# Patient Record
Sex: MALE | Race: WHITE | Employment: OTHER | URBAN - METROPOLITAN AREA
[De-identification: names, ages, dates, MRNs, and addresses within clinical notes are randomized per-mention and may not be internally consistent; named-entity substitution may affect disease eponyms.]

---

## 2017-01-03 ENCOUNTER — TELEPHONE (OUTPATIENT)
Dept: INTERNAL MEDICINE CLINIC | Age: 72
End: 2017-01-03

## 2017-01-03 DIAGNOSIS — N20.0 NEPHROLITHIASIS: Primary | ICD-10-CM

## 2017-01-03 NOTE — TELEPHONE ENCOUNTER
Pt needs new referral dx n20.0  appt date for today    Dr. Arjun Ford [phone]  Tax id [id number]  Fax [phone]

## 2017-01-04 NOTE — TELEPHONE ENCOUNTER
Referral porcessed for Dr. Josefina Denis [id number] 1/1/2017-1/1/2018 99 visits    Copy faxed to doctor and mailed to pt

## 2017-01-09 ENCOUNTER — HOSPITAL ENCOUNTER (OUTPATIENT)
Dept: GENERAL RADIOLOGY | Age: 72
Discharge: HOME | End: 2017-01-09
Payer: MEDICARE

## 2017-01-09 DIAGNOSIS — N20.0 NEPHROLITHIASIS: ICD-10-CM

## 2017-01-09 PROCEDURE — 74000 XR ABD (KUB): CPT

## 2017-01-10 ENCOUNTER — ANESTHESIA EVENT (OUTPATIENT)
Dept: SURGERY | Age: 72
End: 2017-01-10
Payer: MEDICARE

## 2017-01-10 ENCOUNTER — OFFICE VISIT (OUTPATIENT)
Dept: UROLOGY | Age: 72
End: 2017-01-10

## 2017-01-10 VITALS
HEART RATE: 70 BPM | HEIGHT: 71 IN | OXYGEN SATURATION: 98 % | SYSTOLIC BLOOD PRESSURE: 122 MMHG | BODY MASS INDEX: 26.88 KG/M2 | DIASTOLIC BLOOD PRESSURE: 71 MMHG | WEIGHT: 192 LBS

## 2017-01-10 DIAGNOSIS — N20.0 KIDNEY STONE: Primary | ICD-10-CM

## 2017-01-10 DIAGNOSIS — Z96.0 RETAINED URETERAL STENT: ICD-10-CM

## 2017-01-10 LAB
BILIRUB UR QL STRIP: NEGATIVE
GLUCOSE UR-MCNC: NEGATIVE MG/DL
KETONES P FAST UR STRIP-MCNC: NEGATIVE MG/DL
PH UR STRIP: 7 [PH] (ref 4.6–8)
PROT UR QL STRIP: NORMAL MG/DL
SP GR UR STRIP: 1.02 (ref 1–1.03)
UA UROBILINOGEN AMB POC: NORMAL (ref 0.2–1)
URINALYSIS CLARITY POC: CLEAR
URINALYSIS COLOR POC: YELLOW
URINE BLOOD POC: NORMAL
URINE LEUKOCYTES POC: NORMAL
URINE NITRITES POC: NEGATIVE

## 2017-01-10 RX ORDER — CIPROFLOXACIN 500 MG/1
TABLET ORAL
Qty: 3 TAB | Refills: 0 | Status: SHIPPED | OUTPATIENT
Start: 2017-01-10 | End: 2017-04-28 | Stop reason: ALTCHOICE

## 2017-01-10 RX ORDER — CIPROFLOXACIN 2 MG/ML
400 INJECTION, SOLUTION INTRAVENOUS ONCE
Status: CANCELLED | OUTPATIENT
Start: 2017-01-10 | End: 2017-01-10

## 2017-01-10 RX ORDER — SODIUM CHLORIDE 9 MG/ML
100 INJECTION, SOLUTION INTRAVENOUS CONTINUOUS
Status: CANCELLED | OUTPATIENT
Start: 2017-01-10 | End: 2017-01-11

## 2017-01-10 RX ORDER — OXYCODONE AND ACETAMINOPHEN 5; 325 MG/1; MG/1
1 TABLET ORAL
Qty: 40 TAB | Refills: 0 | Status: SHIPPED | OUTPATIENT
Start: 2017-01-10 | End: 2017-04-28 | Stop reason: ALTCHOICE

## 2017-01-10 NOTE — PROGRESS NOTES
[address]    Chief Complaint   Patient presents with    Kidney Stone     stent in place       History and Physical    The patient is a 71-year-old male  who underwent left ureteroscopy with laser trip C and prolonged stent placement because of a large stone in the distal ureter of unknown duration. The patient has been doing well with predictable amounts of exertional hematuria and bladder irritability    Past Medical History   Diagnosis Date    Asbestos exposure     FHx: hypothyroidism     GERD (gastroesophageal reflux disease)     Hyperlipidemia     Hypertension     Idiopathic hypersomnia     Nephrolithiasis     Unspecified sleep apnea      no C-Pap     Patient Active Problem List   Diagnosis Code    Allergic rhinitis J30.9    Nephrolithiasis N20.0    Asbestos exposure Z77.090    Idiopathic hypersomnia G47.11    FHx: hypothyroidism Z83.49    Vertigo R42    Sleep apnea, obstructive G47.33    Obstructive sleep apnea G47.33    Essential hypertension with goal blood pressure less than 140/90 I10    Hyperlipidemia LDL goal <100 E78.5    Acute pain of right shoulder M25.511     Past Surgical History   Procedure Laterality Date    Hx colonoscopy  4/8/14     10 yrs    Hx orthopaedic Right 10/27/2016     rotator cuff repair    Hx tonsillectomy       Current Outpatient Prescriptions   Medication Sig Dispense Refill    oxyCODONE-acetaminophen (PERCOCET) 5-325 mg per tablet Take 1 Tab by mouth every four (4) hours as needed for Pain. Max Daily Amount: 6 Tabs. 40 Tab 0    ciprofloxacin HCl (CIPRO) 500 mg tablet As directed 3 Tab 0    tamsulosin (FLOMAX) 0.4 mg capsule One q d pc 30 Cap 2    lovastatin (MEVACOR) 40 mg tablet TAKE 1 TABLET EVERY NIGHT 90 Tab 3    lisinopril-hydrochlorothiazide (PRINZIDE, ZESTORETIC) 20-25 mg per tablet Take 1 Tab by mouth daily. 90 Tab 3    HYDROmorphone (DILAUDID) 2 mg tablet Take 1 Tab by mouth every three (3) hours as needed for Pain.  Max Daily Amount: 16 mg. 40 Tab 0    docusate sodium (COLACE) 100 mg capsule Take 1 Cap by mouth two (2) times a day for 30 days. 60 Cap 0    ketorolac (TORADOL) 10 mg tablet Take 1 Tab by mouth every six (6) hours as needed for Pain. 30 Tab 0    ibuprofen (MOTRIN) 800 mg tablet Take 800 mg by mouth every six (6) hours as needed for Pain. No Known Allergies  Social History     Social History    Marital status:      Spouse name: N/A    Number of children: N/A    Years of education: N/A     Occupational History    Not on file.      Social History Main Topics    Smoking status: Never Smoker    Smokeless tobacco: Never Used    Alcohol use No    Drug use: No    Sexual activity: Yes     Partners: Female     Other Topics Concern    Not on file     Social History Narrative      Family History   Problem Relation Age of Onset    Heart Attack Father     Heart Disease Father     Hypertension Mother     Other Brother      hypothyroid    Other Sister      hypothyroid    Seizures Son              Visit Vitals    /71 (BP 1 Location: Left arm, BP Patient Position: Sitting)    Pulse 70    Ht 5' 11\" (1.803 m)    Wt 192 lb (87.1 kg)    SpO2 98%    BMI 26.78 kg/m2     Physical        Gen: WDWN adult NAD  Head  : normocephalic,  Normal ROM; eyes without normal pupils, EOMs, no masses;  conjunctiva normal  Neck: normal movement,  no evident mass,  No evident adenopathy, trachea midline,  Lungs clear to auscultation with no rales or ronchi or rubs  Cardiac NSR with no murmur, rub, extra sounds  Abd :bowel sounds normal, no masses, tenderness, organomegaly  Flanks     -    Extremities- no edema, arthritis, deformity, swelling  Psych- oriented, no evident anxiety, no cognitive impairment evident    Urine is hematuric no evidence of infection    KUB shows the stent in good location with no residual fragments                  Impression/ PLAN  Retained stent following ureteroscopy with laser gypsy    Plan:  Cystoscopy with stent removal under anesthesia at patient request.  Prescriptions are arranged ahead of time. The preparation, technique, convalescence are discussed. The patient is aware of the risks that include, but are not limited to, infection, failure, injury, delayed obstruction, and the very real risk that the patient could develop a ureteral stricture because of the unknown duration of this very large stone burden. This visit exceeded 25 minutes and >50% was counselling  The patient understands the discussion and plan    PLEASE NOTE:      This document has been produced using voice recognition software.   Unrecognized errors in transcription may be present    Miguel Alvarez MD

## 2017-01-10 NOTE — PATIENT INSTRUCTIONS
[employer] Activation    Thank you for requesting access to [employer]. Please follow the instructions below to securely access and download your online medical record. [employer] allows you to send messages to your doctor, view your test results, renew your prescriptions, schedule appointments, and more. How Do I Sign Up? 1. In your internet browser, go to [email or web address]. 2. Click on the First Time User? Click Here link in the Sign In box. You will see the New Member Sign Up page. 3. Enter your [employer] Access Code exactly as it appears below. You will not need to use this code after youve completed the sign-up process. If you do not sign up before the expiration date, you must request a new code. [employer] Access Code: [email or web address]  Expires: 2017  2:45 PM (This is the date your [employer] access code will )    4. Enter the last four digits of your Social Security Number (xxxx) and Date of Birth (mm/dd/yyyy) as indicated and click Submit. You will be taken to the next sign-up page. 5. Create a [employer] ID. This will be your [employer] login ID and cannot be changed, so think of one that is secure and easy to remember. 6. Create a [employer] password. You can change your password at any time. 7. Enter your Password Reset Question and Answer. This can be used at a later time if you forget your password. 8. Enter your e-mail address. You will receive e-mail notification when new information is available in [address] 9. Click Sign Up. You can now view and download portions of your medical record. 10. Click the Download Summary menu link to download a portable copy of your medical information. Additional Information    If you have questions, please visit the Frequently Asked Questions section of the [employer] website at [email or web address] Remember, [employer] is NOT to be used for urgent needs. For medical emergencies, dial 911.

## 2017-01-10 NOTE — PROGRESS NOTES
Mr. William Coto has a reminder for a \"due or due soon\" health maintenance. I have asked that he contact his primary care provider for follow-up on this health maintenance.

## 2017-01-10 NOTE — MR AVS SNAPSHOT
Visit Information Date & Time Provider Department Dept. Phone Encounter #  
 1/10/2017  9:15 AM [facility] [address] [phone] Your Appointments 2/9/2017  9:15 AM  
ULTRASOUND with Silvia Cartagena MD  
[facility]) Appt Note: renal ultrasound [address] [address]  
[phone] [address] [id number]  
  
    
 4/21/2017  8:05 AM  
LAB with [facility] NURSE VISIT Internist of [facility] Appt Note: labs for pe rm; Assess for BMI; labs for pe rm Assess for BMI  
 [address] [facility], [facility] [address] [place]  
  
   
 [address], [address]  
  
    
 4/28/2017  1:30 PM  
PHYSICAL with Amanda Kinsye MD  
Internist of [address] [place] [address]) Appt Note: rpe rm; rpe  
 [address] [facility], [address] [facility] [address] [place]  
  
   
 [address], [address] Upcoming Health Maintenance Date Due  
 MEDICARE YEARLY EXAM 10/31/2011 GLAUCOMA SCREENING Q2Y 6/17/2016 COLONOSCOPY 4/8/2024 DTaP/Tdap/Td series (2 - Td) 4/25/2026 Allergies as of 1/10/2017  Review Complete On: 1/10/2017 By: Lana Siu LPN No Known Allergies Current Immunizations  Reviewed on 9/27/2016 Name Date H1N1 FLU VACCINE 1/6/2010 Influenza High Dose Vaccine PF 9/27/2016  4:46 PM  
 Influenza Vaccine PF 10/4/2013 Influenza Vaccine Split 11/26/2012 Influenza Vaccine Whole 9/23/2011 Pneumococcal Conjugate (PCV-13) 9/27/2016  5:07 PM  
 Pneumococcal Vaccine (Unspecified Type) 11/26/2012 Tdap 4/25/2016 12:56 PM  
 Zoster 8/26/2011 Not reviewed this visit You Were Diagnosed With   
  
 Codes Comments Kidney stone    -  Primary ICD-10-CM: N20.0 ICD-9-CM: 592.0 Vitals BP Pulse Height(growth percentile) Weight(growth percentile) SpO2 BMI  
 122/71 (BP 1 Location: Left arm, BP Patient Position: Sitting) 70 5' 11\" (1.803 m) 192 lb (87.1 kg) 98% 26.78 kg/m2 Smoking Status Never Smoker Vitals History BMI and BSA Data Body Mass Index Body Surface Area  
 26.78 kg/m 2 2.09 m 2 Preferred Pharmacy Pharmacy Name Phone [address], [address] [facility] 22 [address] [facility] [phone] Your Updated Medication List  
  
   
This list is accurate as of: 1/10/17  9:59 AM.  Always use your most recent med list.  
  
  
  
  
 ciprofloxacin HCl 500 mg tablet Commonly known as:  CIPRO As directed  
  
 docusate sodium 100 mg capsule Commonly known as:  Joby Frank Take 1 Cap by mouth two (2) times a day for 30 days. HYDROmorphone 2 mg tablet Commonly known as:  DILAUDID Take 1 Tab by mouth every three (3) hours as needed for Pain. Max Daily Amount: 16 mg.  
  
 ibuprofen 800 mg tablet Commonly known as:  MOTRIN Take 800 mg by mouth every six (6) hours as needed for Pain.  
  
 ketorolac 10 mg tablet Commonly known as:  TORADOL Take 1 Tab by mouth every six (6) hours as needed for Pain. lisinopril-hydroCHLOROthiazide 20-25 mg per tablet Commonly known as:  Velasquez Bucy Take 1 Tab by mouth daily. lovastatin 40 mg tablet Commonly known as:  MEVACOR  
TAKE 1 TABLET EVERY NIGHT  
  
 oxyCODONE-acetaminophen 5-325 mg per tablet Commonly known as:  PERCOCET Take 1 Tab by mouth every four (4) hours as needed for Pain. Max Daily Amount: 6 Tabs. tamsulosin 0.4 mg capsule Commonly known as:  FLOMAX One q d pc  
  
  
  
  
Prescriptions Printed Refills  
 oxyCODONE-acetaminophen (PERCOCET) 5-325 mg per tablet 0 Sig: Take 1 Tab by mouth every four (4) hours as needed for Pain. Max Daily Amount: 6 Tabs. Class: Print  Route: Oral  
 ciprofloxacin HCl (CIPRO) 500 mg tablet 0 Sig: As directed Class: Print We Performed the Following AMB POC URINALYSIS DIP STICK AUTO W/O MICRO [50907 CPT(R)] Patient Instructions MyChart Activation Thank you for requesting access to [employer]. Please follow the instructions below to securely access and download your online medical record. [employer] allows you to send messages to your doctor, view your test results, renew your prescriptions, schedule appointments, and more. How Do I Sign Up? 1. In your internet browser, go to [email or web address]. 2. Click on the First Time User? Click Here link in the Sign In box. You will see the New Member Sign Up page. 3. Enter your [employer] Access Code exactly as it appears below. You will not need to use this code after youve completed the sign-up process. If you do not sign up before the expiration date, you must request a new code. [employer] Access Code: [email or web address] Expires: 2017  2:45 PM (This is the date your [employer] access code will ) 4. Enter the last four digits of your Social Security Number (xxxx) and Date of Birth (mm/dd/yyyy) as indicated and click Submit. You will be taken to the next sign-up page. 5. Create a [employer] ID. This will be your [employer] login ID and cannot be changed, so think of one that is secure and easy to remember. 6. Create a [employer] password. You can change your password at any time. 7. Enter your Password Reset Question and Answer. This can be used at a later time if you forget your password. 8. Enter your e-mail address. You will receive e-mail notification when new information is available in [address] 9. Click Sign Up. You can now view and download portions of your medical record. 10. Click the Download Summary menu link to download a portable copy of your medical information. Additional Information If you have questions, please visit the Frequently Asked Questions section of the [employer] website at [email or web address] Remember, [employer] is NOT to be used for urgent needs. For medical emergencies, dial 911. Introducing [facility] SERVICES! Katelyn Ling introduces [employer] patient portal. Now you can access parts of your medical record, email your doctor's office, and request medication refills online. 1. In your internet browser, go to [email or web address] 2. Click on the First Time User? Click Here link in the Sign In box. You will see the New Member Sign Up page. 3. Enter your [employer] Access Code exactly as it appears below. You will not need to use this code after youve completed the sign-up process. If you do not sign up before the expiration date, you must request a new code. · [employer] Access Code: [email or web address] Expires: 2/5/2017  2:45 PM 
 
4. Enter the last four digits of your Social Security Number (xxxx) and Date of Birth (mm/dd/yyyy) as indicated and click Submit. You will be taken to the next sign-up page. 5. Create a [employer] ID. This will be your [employer] login ID and cannot be changed, so think of one that is secure and easy to remember. 6. Create a [employer] password. You can change your password at any time. 7. Enter your Password Reset Question and Answer. This can be used at a later time if you forget your password. 8. Enter your e-mail address. You will receive e-mail notification when new information is available in [address] 9. Click Sign Up. You can now view and download portions of your medical record. 10. Click the Download Summary menu link to download a portable copy of your medical information. If you have questions, please visit the Frequently Asked Questions section of the [employer] website. Remember, [employer] is NOT to be used for urgent needs. For medical emergencies, dial 911. Now available from your iPhone and Android! Please provide this summary of care documentation to your next provider. Your primary care clinician is listed as Dave Acevedo. If you have any questions after today's visit, please call [phone].

## 2017-01-11 ENCOUNTER — ANESTHESIA (OUTPATIENT)
Dept: SURGERY | Age: 72
End: 2017-01-11
Payer: MEDICARE

## 2017-01-11 ENCOUNTER — SURGERY (OUTPATIENT)
Age: 72
End: 2017-01-11

## 2017-01-11 ENCOUNTER — HOSPITAL ENCOUNTER (OUTPATIENT)
Age: 72
Setting detail: OUTPATIENT SURGERY
Discharge: HOME | End: 2017-01-11
Attending: UROLOGY | Admitting: UROLOGY
Payer: MEDICARE

## 2017-01-11 VITALS
BODY MASS INDEX: 26.6 KG/M2 | WEIGHT: 190 LBS | OXYGEN SATURATION: 100 % | RESPIRATION RATE: 20 BRPM | SYSTOLIC BLOOD PRESSURE: 130 MMHG | HEIGHT: 71 IN | TEMPERATURE: 97.4 F | HEART RATE: 55 BPM | DIASTOLIC BLOOD PRESSURE: 81 MMHG

## 2017-01-11 LAB
BUN BLD-MCNC: 20 MG/DL (ref 7–18)
CHLORIDE BLD-SCNC: 104 MMOL/L (ref 100–108)
GLUCOSE BLD STRIP.AUTO-MCNC: 93 MG/DL (ref 74–106)
HCT VFR BLD CALC: 40 % (ref 36–49)
HGB BLD-MCNC: 13.6 G/DL (ref 12–16)
POTASSIUM BLD-SCNC: 3.9 MMOL/L (ref 3.5–5.5)
SODIUM BLD-SCNC: 139 MMOL/L (ref 136–145)

## 2017-01-11 PROCEDURE — 77030032490 HC SLV COMPR SCD KNE COVD -B: Performed by: UROLOGY

## 2017-01-11 PROCEDURE — 76210000020 HC REC RM PH II FIRST 0.5 HR: Performed by: UROLOGY

## 2017-01-11 PROCEDURE — 77030018836 HC SOL IRR NACL ICUM -A: Performed by: UROLOGY

## 2017-01-11 PROCEDURE — 74011250636 HC RX REV CODE- 250/636

## 2017-01-11 PROCEDURE — 77030020782 HC GWN BAIR PAWS FLX 3M -B: Performed by: UROLOGY

## 2017-01-11 PROCEDURE — 76060000031 HC ANESTHESIA FIRST 0.5 HR: Performed by: UROLOGY

## 2017-01-11 PROCEDURE — 74011250636 HC RX REV CODE- 250/636: Performed by: UROLOGY

## 2017-01-11 PROCEDURE — 74011000250 HC RX REV CODE- 250

## 2017-01-11 PROCEDURE — 77030019927 HC TBNG IRR CYSTO BAXT -A: Performed by: UROLOGY

## 2017-01-11 PROCEDURE — 82947 ASSAY GLUCOSE BLOOD QUANT: CPT

## 2017-01-11 PROCEDURE — 76010000154 HC OR TIME FIRST 0.5 HR: Performed by: UROLOGY

## 2017-01-11 PROCEDURE — 74011250636 HC RX REV CODE- 250/636: Performed by: NURSE ANESTHETIST, CERTIFIED REGISTERED

## 2017-01-11 PROCEDURE — 76210000016 HC OR PH I REC 1 TO 1.5 HR: Performed by: UROLOGY

## 2017-01-11 PROCEDURE — 77030010509 HC AIRWY LMA MSK TELE -A: Performed by: ANESTHESIOLOGY

## 2017-01-11 PROCEDURE — 74011250637 HC RX REV CODE- 250/637: Performed by: NURSE ANESTHETIST, CERTIFIED REGISTERED

## 2017-01-11 RX ORDER — CIPROFLOXACIN 2 MG/ML
400 INJECTION, SOLUTION INTRAVENOUS ONCE
Status: COMPLETED | OUTPATIENT
Start: 2017-01-11 | End: 2017-01-11

## 2017-01-11 RX ORDER — SODIUM CHLORIDE 0.9 % (FLUSH) 0.9 %
5-10 SYRINGE (ML) INJECTION AS NEEDED
Status: DISCONTINUED | OUTPATIENT
Start: 2017-01-11 | End: 2017-01-11 | Stop reason: HOSPADM

## 2017-01-11 RX ORDER — SODIUM CHLORIDE, SODIUM LACTATE, POTASSIUM CHLORIDE, CALCIUM CHLORIDE 600; 310; 30; 20 MG/100ML; MG/100ML; MG/100ML; MG/100ML
75 INJECTION, SOLUTION INTRAVENOUS CONTINUOUS
Status: DISCONTINUED | OUTPATIENT
Start: 2017-01-11 | End: 2017-01-11 | Stop reason: HOSPADM

## 2017-01-11 RX ORDER — MAGNESIUM SULFATE 100 %
4 CRYSTALS MISCELLANEOUS AS NEEDED
Status: DISCONTINUED | OUTPATIENT
Start: 2017-01-11 | End: 2017-01-11 | Stop reason: HOSPADM

## 2017-01-11 RX ORDER — PROPOFOL 10 MG/ML
INJECTION, EMULSION INTRAVENOUS AS NEEDED
Status: DISCONTINUED | OUTPATIENT
Start: 2017-01-11 | End: 2017-01-11 | Stop reason: HOSPADM

## 2017-01-11 RX ORDER — HYDROMORPHONE HYDROCHLORIDE 2 MG/ML
0.5 INJECTION, SOLUTION INTRAMUSCULAR; INTRAVENOUS; SUBCUTANEOUS AS NEEDED
Status: DISCONTINUED | OUTPATIENT
Start: 2017-01-11 | End: 2017-01-11 | Stop reason: HOSPADM

## 2017-01-11 RX ORDER — SODIUM CHLORIDE 9 MG/ML
100 INJECTION, SOLUTION INTRAVENOUS CONTINUOUS
Status: DISCONTINUED | OUTPATIENT
Start: 2017-01-11 | End: 2017-01-11 | Stop reason: HOSPADM

## 2017-01-11 RX ORDER — ONDANSETRON 2 MG/ML
4 INJECTION INTRAMUSCULAR; INTRAVENOUS ONCE
Status: DISCONTINUED | OUTPATIENT
Start: 2017-01-11 | End: 2017-01-11 | Stop reason: HOSPADM

## 2017-01-11 RX ORDER — DEXTROSE 50 % IN WATER (D50W) INTRAVENOUS SYRINGE
25-50 AS NEEDED
Status: DISCONTINUED | OUTPATIENT
Start: 2017-01-11 | End: 2017-01-11 | Stop reason: HOSPADM

## 2017-01-11 RX ORDER — FENTANYL CITRATE 50 UG/ML
INJECTION, SOLUTION INTRAMUSCULAR; INTRAVENOUS AS NEEDED
Status: DISCONTINUED | OUTPATIENT
Start: 2017-01-11 | End: 2017-01-11 | Stop reason: HOSPADM

## 2017-01-11 RX ORDER — LIDOCAINE HYDROCHLORIDE 20 MG/ML
INJECTION, SOLUTION EPIDURAL; INFILTRATION; INTRACAUDAL; PERINEURAL AS NEEDED
Status: DISCONTINUED | OUTPATIENT
Start: 2017-01-11 | End: 2017-01-11 | Stop reason: HOSPADM

## 2017-01-11 RX ORDER — FAMOTIDINE 20 MG/1
20 TABLET, FILM COATED ORAL ONCE
Status: COMPLETED | OUTPATIENT
Start: 2017-01-11 | End: 2017-01-11

## 2017-01-11 RX ORDER — SODIUM CHLORIDE 0.9 % (FLUSH) 0.9 %
5-10 SYRINGE (ML) INJECTION EVERY 8 HOURS
Status: DISCONTINUED | OUTPATIENT
Start: 2017-01-11 | End: 2017-01-11 | Stop reason: HOSPADM

## 2017-01-11 RX ADMIN — LIDOCAINE HYDROCHLORIDE 60 MG: 20 INJECTION, SOLUTION EPIDURAL; INFILTRATION; INTRACAUDAL; PERINEURAL at 13:18

## 2017-01-11 RX ADMIN — CIPROFLOXACIN 400 MG: 2 INJECTION, SOLUTION INTRAVENOUS at 13:10

## 2017-01-11 RX ADMIN — PROPOFOL 150 MG: 10 INJECTION, EMULSION INTRAVENOUS at 13:18

## 2017-01-11 RX ADMIN — FENTANYL CITRATE 50 MCG: 50 INJECTION, SOLUTION INTRAMUSCULAR; INTRAVENOUS at 13:24

## 2017-01-11 RX ADMIN — FAMOTIDINE 20 MG: 20 TABLET ORAL at 10:45

## 2017-01-11 RX ADMIN — SODIUM CHLORIDE, SODIUM LACTATE, POTASSIUM CHLORIDE, AND CALCIUM CHLORIDE 75 ML/HR: 600; 310; 30; 20 INJECTION, SOLUTION INTRAVENOUS at 10:45

## 2017-01-11 RX ADMIN — FENTANYL CITRATE 50 MCG: 50 INJECTION, SOLUTION INTRAMUSCULAR; INTRAVENOUS at 13:18

## 2017-01-11 NOTE — ANESTHESIA PREPROCEDURE EVALUATION
Anesthetic History   No history of anesthetic complications            Review of Systems / Medical History  Patient summary reviewed and pertinent labs reviewed    Pulmonary        Sleep apnea (Does not use regularly): CPAP           Neuro/Psych   Within defined limits           Cardiovascular    Hypertension: well controlled              Exercise tolerance: >4 METS     GI/Hepatic/Renal             Pertinent negatives: GERD: food related. Endo/Other             Other Findings   Comments: Current Smoker? NO       Elective Surgery? Yes       Abstained from smoking 24 hours prior to anesthesia? N/A    Risk Factors for Postoperative nausea/vomiting:       History of postoperative nausea/vomiting? NO       Female? NO       Motion sickness? NO       Intended opioid administration for postoperative analgesia?   NO           Physical Exam    Airway  Mallampati: II  TM Distance: 4 - 6 cm  Neck ROM: normal range of motion   Mouth opening: Normal     Cardiovascular  Regular rate and rhythm,  S1 and S2 normal,  no murmur, click, rub, or gallop             Dental  No notable dental hx       Pulmonary  Breath sounds clear to auscultation               Abdominal  GI exam deferred       Other Findings            Anesthetic Plan    ASA: 2  Anesthesia type: general          Induction: Intravenous  Anesthetic plan and risks discussed with: Patient

## 2017-01-11 NOTE — IP AVS SNAPSHOT
Jose M Bragg 
 
 
 [address] Patient: Sneha Moses MRN: [id number] :1945 You are allergic to the following No active allergies Recent Documentation Height Weight BMI Smoking Status 1.803 m 86.2 kg 26.5 kg/m2 Never Smoker Emergency Contacts Name Discharge Info Relation Home Work Mobile Siobhan Bean DISCHARGE CAREGIVER [3] Spouse [3] [phone] About your hospitalization You were admitted on:  2017 You last received care in the:  [facility] PACU You were discharged on:  2017 Unit phone number:  [phone] Why you were hospitalized Your primary diagnosis was:  Not on File Providers Seen During Your Hospitalizations Provider Role Specialty Primary office phone Shaka Bustamante MD Attending Provider Urology [phone] Your Primary Care Physician (PCP) Primary Care Physician Office Phone Office Fax Early Lamont [phone] Follow-up Information Follow up With Details Comments Contact Info Britton Peterson MD   [address] [facility] [address] [facility] 
[phone] Shaka Bustamante MD  Follow up in office in one month for ultrasound [address] [facility] [address] [facility] [address] [address] 
[phone] Your Appointments   9:15 AM EST ULTRASOUND with Shaka Bustamante MD  
[facility] [address] [place] [address]) [address] [facility] [address] [address]  
[phone] Current Discharge Medication List  
  
CONTINUE these medications which have NOT CHANGED Dose & Instructions Dispensing Information Comments Morning Noon Evening Bedtime  
 ciprofloxacin HCl 500 mg tablet Commonly known as:  CIPRO Your next dose is: Today, Tomorrow Other:  _________ As directed Quantity:  3 Tab Refills:  0  
     
   
   
   
  
 docusate sodium 100 mg capsule Commonly known as:  Edwige Stallings Your next dose is: Today, Tomorrow Other:  _________ Dose:  100 mg Take 1 Cap by mouth two (2) times a day for 30 days. Quantity:  60 Cap Refills:  0 HYDROmorphone 2 mg tablet Commonly known as:  DILAUDID Your next dose is: Today, Tomorrow Other:  _________ Dose:  2 mg Take 1 Tab by mouth every three (3) hours as needed for Pain. Max Daily Amount: 16 mg.  
 Quantity:  40 Tab Refills:  0  
     
   
   
   
  
 ibuprofen 800 mg tablet Commonly known as:  MOTRIN Your next dose is: Today, Tomorrow Other:  _________ Dose:  800 mg Take 800 mg by mouth every six (6) hours as needed for Pain. Refills:  0  
     
   
   
   
  
 ketorolac 10 mg tablet Commonly known as:  TORADOL Your next dose is: Today, Tomorrow Other:  _________ Dose:  10 mg Take 1 Tab by mouth every six (6) hours as needed for Pain. Quantity:  30 Tab Refills:  0  
     
   
   
   
  
 lisinopril-hydroCHLOROthiazide 20-25 mg per tablet Commonly known as:  Kathalene Rockers Your next dose is: Today, Tomorrow Other:  _________ Dose:  1 Tab Take 1 Tab by mouth daily. Quantity:  90 Tab Refills:  3  
     
   
   
   
  
 lovastatin 40 mg tablet Commonly known as:  MEVACOR Your next dose is: Today, Tomorrow Other:  _________ TAKE 1 TABLET EVERY NIGHT Quantity:  90 Tab Refills:  3  
     
   
   
   
  
 oxyCODONE-acetaminophen 5-325 mg per tablet Commonly known as:  PERCOCET Your next dose is: Today, Tomorrow Other:  _________ Dose:  1 Tab Take 1 Tab by mouth every four (4) hours as needed for Pain.  Max Daily Amount: 6 Tabs. Quantity:  40 Tab Refills:  0  
     
   
   
   
  
 tamsulosin 0.4 mg capsule Commonly known as:  FLOMAX Your next dose is: Today, Tomorrow Other:  _________ One q d pc Quantity:  30 Cap Refills:  2 Discharge Instructions Cystoscopy: What to Expect at [facility] Your Recovery A cystoscopy is a procedure that lets a doctor look inside of the bladder and the urethra. The urethra is the tube that carries urine from the bladder to outside the body. The doctor uses a thin, lighted tube called a cystoscope to look for kidney or bladder stones, tumors, bleeding, or infection. After the cystoscopy, your urethra may be sore at first, and it may burn when you urinate for the first few days after the procedure. You may feel the need to urinate more often, and your urine may be pink. These symptoms should get better in 1 or 2 days. You will probably be able to go back to work or most of your usual activities in 1 or 2 days. This care sheet gives you a general idea about how long it will take for you to recover. But each person recovers at a different pace. Follow the steps below to get better as quickly as possible. How can you care for yourself at home? Activity · Rest when you feel tired. Getting enough sleep will help you recover. · Try to walk each day. Start by walking a little more than you did the day before. Bit by bit, increase the amount you walk. Walking boosts blood flow and helps prevent pneumonia and constipation. · Avoid strenuous activities, such as bicycle riding, jogging, weight lifting, or aerobic exercise, until your doctor says it is okay. · Ask your doctor when you can drive again. · Most people are able to return to work within 1 or 2 days after the procedure. · You may shower and take baths as usual. 
· Ask your doctor when it is okay for you to have sex. Diet · You can eat your normal diet. If your stomach is upset, try bland, low-fat foods like plain rice, broiled chicken, toast, and yogurt. · Drink plenty of fluids (unless your doctor tells you not to). Medicines · Take pain medicines exactly as directed. ¨ If the doctor gave you a prescription medicine for pain, take it as prescribed. ¨ If you are not taking a prescription pain medicine, ask your doctor if you can take an over-the-counter medicine. · If you think your pain medicine is making you sick to your stomach: 
¨ Take your medicine after meals (unless your doctor has told you not to). ¨ Ask your doctor for a different pain medicine. · If your doctor prescribed antibiotics, take them as directed. Do not stop taking them just because you feel better. You need to take the full course of antibiotics. Follow-up care is a key part of your treatment and safety. Be sure to make and go to all appointments, and call your doctor if you are having problems. It's also a good idea to know your test results and keep a list of the medicines you take. When should you call for help? Call 911 anytime you think you may need emergency care. For example, call if: 
· You passed out (lost consciousness). · You have severe trouble breathing. · You have sudden chest pain and shortness of breath, or you cough up blood. · You have severe belly pain. Call your doctor now or seek immediate medical care if: 
· You are sick to your stomach or cannot keep fluids down. · Your urine is still red or you see blood clots after you have urinated several times. · You have trouble passing urine or stool, especially if you have pain or swelling in your lower belly. · You have signs of a blood clot, such as: 
¨ Pain in your calf, back of the knee, thigh, or groin. ¨ Redness and swelling in your leg or groin. · You develop a fever or severe chills. · You have pain in your back just below your rib cage. This is called flank pain. Watch closely for changes in your health, and be sure to contact your doctor if: 
· You have pain or burning when you urinate. A burning feeling is normal for a day or two after the test, but call if it does not get better. · You have a frequent urge to urinate but can pass only small amounts of urine. · Your urine is pink, red, or cloudy, or smells bad. It is normal for the urine to have a pinkish color for a few days after the test, but call if it does not get better. Where can you learn more? Go to http://adam-andrei.info/. Enter I597 in the search box to learn more about \"Cystoscopy: What to Expect at Home. \" Current as of: August 12, 2016 Content Version: 11.1 © 2006-2016 [employer]. Care instructions adapted under license by [employer] (which disclaims liability or warranty for this information). If you have questions about a medical condition or this instruction, always ask your healthcare professional. [place] [address] any warranty or liability for your use of this information. DISCHARGE SUMMARY from Nurse The following personal items are in your possession at time of discharge: 
 
Dental Appliances: None Home Medications: None Jewelry: None Clothing: Jacket/Coat, Pants, Undergarments, Footwear, [address], [employer] Other Valuables: Chaitanya Rahman PATIENT INSTRUCTIONS: 
 
 
F-face looks uneven A-arms unable to move or move unevenly S-speech slurred or non-existent T-time-call 911 as soon as signs and symptoms begin-DO NOT go Back to bed or wait to see if you get better-TIME IS BRAIN. Warning Signs of HEART ATTACK Call 911 if you have these symptoms: 
? Chest discomfort.  Most heart attacks involve discomfort in the center of the chest that lasts more than a few minutes, or that goes away and comes back. It can feel like uncomfortable pressure, squeezing, fullness, or pain. ? Discomfort in other areas of the upper body. Symptoms can include pain or discomfort in one or both arms, the back, neck, jaw, or stomach. ? Shortness of breath with or without chest discomfort. ? Other signs may include breaking out in a cold sweat, nausea, or lightheadedness. Don't wait more than five minutes to call [address]! Fast action can save your life. Calling 911 is almost always the fastest way to get lifesaving treatment. Emergency Medical Services staff can begin treatment when they arrive  up to an hour sooner than if someone gets to the hospital by car. The discharge information has been reviewed with the patient and spouse. The patient and spouse verbalized understanding. Discharge medications reviewed with the patient and spouse and appropriate educational materials and side effects teaching were provided. Discharge Orders None Introducing [facility]! Marcella Barfield introduces Springpad patient portal. Now you can access parts of your medical record, email your doctor's office, and request medication refills online. 1. In your internet browser, go to [email or web address] 2. Click on the First Time User? Click Here link in the Sign In box. You will see the New Member Sign Up page. 3. Enter your Springpad Access Code exactly as it appears below. You will not need to use this code after youve completed the sign-up process. If you do not sign up before the expiration date, you must request a new code. · Springpad Access Code: [email or web address] Expires: 2/5/2017  2:45 PM 
 
4. Enter the last four digits of your Social Security Number (xxxx) and Date of Birth (mm/dd/yyyy) as indicated and click Submit. You will be taken to the next sign-up page. 5. Create a Springpad ID.  This will be your Springpad login ID and cannot be changed, so think of one that is secure and easy to remember. 6. Create a Playteau password. You can change your password at any time. 7. Enter your Password Reset Question and Answer. This can be used at a later time if you forget your password. 8. Enter your e-mail address. You will receive e-mail notification when new information is available in [address] 9. Click Sign Up. You can now view and download portions of your medical record. 10. Click the Download Summary menu link to download a portable copy of your medical information. If you have questions, please visit the Frequently Asked Questions section of the Playteau website. Remember, Playteau is NOT to be used for urgent needs. For medical emergencies, dial 911. Now available from your iPhone and Android! General Information Please provide this summary of care documentation to your next provider. Patient Signature:  ____________________________________________________________ Date:  ____________________________________________________________  
  
Heather Alvarado Provider Signature:  ____________________________________________________________ Date:  ____________________________________________________________

## 2017-01-11 NOTE — DISCHARGE INSTRUCTIONS
Cystoscopy: What to Expect at [address] [facility]    A cystoscopy is a procedure that lets a doctor look inside of the bladder and the urethra. The urethra is the tube that carries urine from the bladder to outside the body. The doctor uses a thin, lighted tube called a cystoscope to look for kidney or bladder stones, tumors, bleeding, or infection. After the cystoscopy, your urethra may be sore at first, and it may burn when you urinate for the first few days after the procedure. You may feel the need to urinate more often, and your urine may be pink. These symptoms should get better in 1 or 2 days. You will probably be able to go back to work or most of your usual activities in 1 or 2 days. This care sheet gives you a general idea about how long it will take for you to recover. But each person recovers at a different pace. Follow the steps below to get better as quickly as possible. How can you care for yourself at home? Activity  · Rest when you feel tired. Getting enough sleep will help you recover. · Try to walk each day. Start by walking a little more than you did the day before. Bit by bit, increase the amount you walk. Walking boosts blood flow and helps prevent pneumonia and constipation. · Avoid strenuous activities, such as bicycle riding, jogging, weight lifting, or aerobic exercise, until your doctor says it is okay. · Ask your doctor when you can drive again. · Most people are able to return to work within 1 or 2 days after the procedure. · You may shower and take baths as usual.  · Ask your doctor when it is okay for you to have sex. Diet  · You can eat your normal diet. If your stomach is upset, try bland, low-fat foods like plain rice, broiled chicken, toast, and yogurt. · Drink plenty of fluids (unless your doctor tells you not to). Medicines  · Take pain medicines exactly as directed. ¨ If the doctor gave you a prescription medicine for pain, take it as prescribed.   ¨ If you are not taking a prescription pain medicine, ask your doctor if you can take an over-the-counter medicine. · If you think your pain medicine is making you sick to your stomach:  ¨ Take your medicine after meals (unless your doctor has told you not to). ¨ Ask your doctor for a different pain medicine. · If your doctor prescribed antibiotics, take them as directed. Do not stop taking them just because you feel better. You need to take the full course of antibiotics. Follow-up care is a key part of your treatment and safety. Be sure to make and go to all appointments, and call your doctor if you are having problems. It's also a good idea to know your test results and keep a list of the medicines you take. When should you call for help? Call 911 anytime you think you may need emergency care. For example, call if:  · You passed out (lost consciousness). · You have severe trouble breathing. · You have sudden chest pain and shortness of breath, or you cough up blood. · You have severe belly pain. Call your doctor now or seek immediate medical care if:  · You are sick to your stomach or cannot keep fluids down. · Your urine is still red or you see blood clots after you have urinated several times. · You have trouble passing urine or stool, especially if you have pain or swelling in your lower belly. · You have signs of a blood clot, such as:  ¨ Pain in your calf, back of the knee, thigh, or groin. ¨ Redness and swelling in your leg or groin. · You develop a fever or severe chills. · You have pain in your back just below your rib cage. This is called flank pain. Watch closely for changes in your health, and be sure to contact your doctor if:  · You have pain or burning when you urinate. A burning feeling is normal for a day or two after the test, but call if it does not get better. · You have a frequent urge to urinate but can pass only small amounts of urine. · Your urine is pink, red, or cloudy, or smells bad. It is normal for the urine to have a pinkish color for a few days after the test, but call if it does not get better. Where can you learn more? Go to http://adam-andrei.info/. Enter U769 in the search box to learn more about \"Cystoscopy: What to Expect at Home. \"  Current as of: August 12, 2016  Content Version: 11.1  © 7696-9380 [employer]. Care instructions adapted under license by [employer] (which disclaims liability or warranty for this information). If you have questions about a medical condition or this instruction, always ask your healthcare professional. [place] [address] any warranty or liability for your use of this information. DISCHARGE SUMMARY from Nurse    The following personal items are in your possession at time of discharge:    Dental Appliances: None        Home Medications: None  Jewelry: None  Clothing: Jacket/Coat, Pants, Undergarments, Footwear, Hat, Shirt  Other Valuables: Eyeglasses, Wallet   PATIENT INSTRUCTIONS:    After general anesthesia or intravenous sedation, for 24 hours or while taking prescription Narcotics:  · Limit your activities  · Do not drive and operate hazardous machinery  · Do not make important personal or business decisions  · Do  not drink alcoholic beverages  · If you have not urinated within 8 hours after discharge, please contact your surgeon on call. Report the following to your surgeon:  · Excessive pain, swelling, redness or odor of or around the surgical area  · Temperature over 100.5  · Nausea and vomiting lasting longer than 4 hours or if unable to take medications  · Any signs of decreased circulation or nerve impairment to extremity: change in color, persistent  numbness, tingling, coldness or increase pain  · Any questions      *  Please give a list of your current medications to your Primary Care Provider.     *  Please update this list whenever your medications are discontinued, doses are      changed, or new medications (including over-the-counter products) are added. *  Please carry medication information at all times in case of emergency situations. These are general instructions for a healthy lifestyle:    No smoking/ No tobacco products/ Avoid exposure to second hand smoke    Surgeon General's Warning:  Quitting smoking now greatly reduces serious risk to your health. Obesity, smoking, and sedentary lifestyle greatly increases your risk for illness    A healthy diet, regular physical exercise & weight monitoring are important for maintaining a healthy lifestyle    You may be retaining fluid if you have a history of heart failure or if you experience any of the following symptoms:  Weight gain of 3 pounds or more overnight or 5 pounds in a week, increased swelling in our hands or feet or shortness of breath while lying flat in bed. Please call your doctor as soon as you notice any of these symptoms; do not wait until your next office visit. Recognize signs and symptoms of STROKE:    F-face looks uneven    A-arms unable to move or move unevenly    S-speech slurred or non-existent    T-time-call 911 as soon as signs and symptoms begin-DO NOT go       Back to bed or wait to see if you get better-TIME IS BRAIN. Warning Signs of HEART ATTACK     Call 911 if you have these symptoms:   Chest discomfort. Most heart attacks involve discomfort in the center of the chest that lasts more than a few minutes, or that goes away and comes back. It can feel like uncomfortable pressure, squeezing, fullness, or pain.  Discomfort in other areas of the upper body. Symptoms can include pain or discomfort in one or both arms, the back, neck, jaw, or stomach.  Shortness of breath with or without chest discomfort.  Other signs may include breaking out in a cold sweat, nausea, or lightheadedness. Don't wait more than five minutes to call 911 - MINUTES MATTER!  Fast action can save your life. Calling 911 is almost always the fastest way to get lifesaving treatment. Emergency Medical Services staff can begin treatment when they arrive -- up to an hour sooner than if someone gets to the hospital by car. The discharge information has been reviewed with the patient and spouse. The patient and spouse verbalized understanding. Discharge medications reviewed with the patient and spouse and appropriate educational materials and side effects teaching were provided.

## 2017-01-11 NOTE — PERIOP NOTES
Patient armband removed and shredded    Patient confirmed by two identifiers with discharge instructions prior to being provided to patient and spouse.

## 2017-01-11 NOTE — OP NOTES
[address]    Name:  Dajuan Mercedes  MR#:  [id number]  :  1945  Account #:  [de-identified]  Date of Adm:  2017  Date of Surgery:  2017      PREOPERATIVE DIAGNOSIS: Retained left ureteral stent. POSTOPERATIVE DIAGNOSIS: Retained left ureteral stent. PROCEDURES PERFORMED  1. Cystoscopy. 2. Stent removal.    ANESTHESIA:  gen    INDICATIONS: The patient is 73-year-old  male who had  undergone a recent ureteroscopy with lasertripsy and stent placement. KUB shows no evidence of retained fragments and the patient comes  now for cystoscopy with stent removal under anesthesia. He is aware  of the preparation technique and convalescence. He is aware of the  risks that include, but are not limited to infection, blood loss, failure,  injury, post-procedural pain and possible need for additional pain  medications, and also the definite need to follow up and have imaging  studies because of the risk that the patient could develop a stricture  because of the unknown duration of this stone. He wishes to proceed,  giving his informed consent. ESTIMATED BLOOD LOSS: 0 mL. SPECIMENS REMOVED: stent    COMPLICATIONS: None. FINDINGS: Stent in place. DESCRIPTION OF PROCEDURE: The patient is anesthetized via the  general LMA route. Time-out was accomplished and verified. The  patient is placed in the dorsal lithotomy position where he is prepped  and draped in sterile fashion. A rigid scope is inserted and retrograde  evaluation confirms normal male anatomy. The stent is identified, it is  grasped with a flexible grasper and removed. The scope is passed and  the bladder appears to be as anticipated. The bladder is drained. The  scope is removed. The stent is inspected and is intact. Procedure is  terminated. The patient tolerated well.         MD JAYCEE Pulliam / Dinora.Standard  D:  2017   13:31  T:  2017   13:57  Job #:  [id number]

## 2017-01-11 NOTE — BRIEF OP NOTE
BRIEF OPERATIVE NOTE    Date of Procedure: 1/11/2017   Preoperative Diagnosis: Kidney stone [N20.0]  Retained ureteral stent [Z96.0]  Postoperative Diagnosis: Kidney stone [N20.0]  Retained ureteral stent [Z96.0]    Procedure(s):  CYSTOSCOPY URETERAL STENT REMOVAL  Surgeon(s) and Role:     * Miguel Alvarez MD - Primary            Surgical Staff:  Circ-1: Lloyd Marsh RN  Scrub Tech-1: [employer] Screen  Event Time In   Incision Start 1323   Incision Close      Anesthesia: General   Estimated Blood Loss: 0cc  Specimens: * No specimens in log *   Findings: stent in place  Complications: none  Implants: * No implants in log *

## 2017-01-11 NOTE — ANESTHESIA POSTPROCEDURE EVALUATION
Post-Anesthesia Evaluation and Assessment    Patient: Lisa Coreas MRN: [id number]  SSN: [id number]    YOB: 1945  Age: 80 y.o. Sex: male       Cardiovascular Function/Vital Signs  Visit Vitals    /81 (BP 1 Location: Right arm, BP Patient Position: At rest)    Pulse (!) 55    Temp 36.3 °C (97.4 °F)    Resp 20    Ht 5' 11\" (1.803 m)    Wt 86.2 kg (190 lb)    SpO2 100%    BMI 26.5 kg/m2       Patient is status post general anesthesia for Procedure(s):  CYSTOSCOPY URETERAL STENT REMOVAL. Nausea/Vomiting: None    Postoperative hydration reviewed and adequate. Pain:  Pain Scale 1: Numeric (0 - 10) (01/11/17 1443)  Pain Intensity 1: 0 (01/11/17 1443)   Managed    Neurological Status:   Neuro (WDL): Within Defined Limits (01/11/17 1332)   At baseline    Mental Status and Level of Consciousness: Arousable    Pulmonary Status:   O2 Device: Room air (01/11/17 1443)   Adequate oxygenation and airway patent    Complications related to anesthesia: None    Post-anesthesia assessment completed.  No concerns    Signed By: Marlee Bahena MD     January 11, 2017

## 2017-01-12 ENCOUNTER — PATIENT OUTREACH (OUTPATIENT)
Dept: INTERNAL MEDICINE CLINIC | Age: 72
End: 2017-01-12

## 2017-01-12 NOTE — PROGRESS NOTES
NNTOCIP  Patient admitted to [facility] for a scheduled procedure on 1/11/17 to 1/11/17 for cystoscopy ureteral stent removal. Ureteral stent placed 12/14/16 for ureteral calculus    Indication: Retained left ureteral stent     Contacted patient for post procedural follow up. Verified 2 patient identifiers. Introduced self, role and reason for call. Patient reported the following:  \"Doing very well\"  Bleeding, urinating and clots last night; resolved   Voiding yellow urine; no visible blood  Took Cipro today; one dose left  Took Percocet this AM    Patient denied the following:  Dysuria  Penile pain  Abdominal/flank pain  N/V  Chills/fever    Patient reported the following on the patients ADL's:  Feeds self: yes  Ambulates: yes   Self grooming: yes  Toileting: yes    DME: None    Appointments:  Dr. Guillermina Lawrence (urology) 2/9/2017 at [address] [place] [address]  Patient aware of appointments and plans to drive self. Support: Wife    AMD: Not on file    Educated patient to monitor and report the following Red flags: return of bleeding or clots, difficulty urinating, lower abdominal pain or flank pain, N/V, chills/fever penile or urinary pain or any new or concerning symptoms. Patient verbalized understanding of information discussed and is aware of  when to seek medical attention from urology, PCP or ED. Opportunity to ask questions was provided. Contact information was provided for future reference or further questions. Barriers to care  No apparent  barriers to care identified at this time. Adherence to previous treatment and likelihood for follow-up:  Patient verbalized understanding of discharge instructions and special follow up.        Start time: 18    Stop time: 1115    Total time: 23 minutes

## 2017-02-09 ENCOUNTER — OFFICE VISIT (OUTPATIENT)
Dept: UROLOGY | Age: 72
End: 2017-02-09

## 2017-02-09 VITALS
DIASTOLIC BLOOD PRESSURE: 76 MMHG | TEMPERATURE: 97.6 F | HEART RATE: 59 BPM | WEIGHT: 193 LBS | HEIGHT: 71 IN | SYSTOLIC BLOOD PRESSURE: 128 MMHG | BODY MASS INDEX: 27.02 KG/M2 | OXYGEN SATURATION: 97 %

## 2017-02-09 DIAGNOSIS — N20.0 KIDNEY STONES: Primary | ICD-10-CM

## 2017-02-09 LAB
BILIRUB UR QL STRIP: NEGATIVE
GLUCOSE UR-MCNC: NEGATIVE MG/DL
KETONES P FAST UR STRIP-MCNC: NEGATIVE MG/DL
PH UR STRIP: 6.5 [PH] (ref 4.6–8)
PROT UR QL STRIP: NEGATIVE MG/DL
SP GR UR STRIP: 1.02 (ref 1–1.03)
UA UROBILINOGEN AMB POC: NORMAL (ref 0.2–1)
URINALYSIS CLARITY POC: CLEAR
URINALYSIS COLOR POC: YELLOW
URINE BLOOD POC: NEGATIVE
URINE LEUKOCYTES POC: NEGATIVE
URINE NITRITES POC: NEGATIVE

## 2017-02-09 NOTE — PROGRESS NOTES
patient returns for renal ultrasound to assess for residual hydronephrosis because the patient had a large distal stone for an unknown period of time and I was worried about developing scar tissue. The patient is now not quite 1 month. The patient has no flank pain and no hematuria and has passed no stones since last visit. The patient does have a notice of urgency whenever he gets up from a chair. He does consume 1 cup of coffee in the morning. His urine today is negative    The patient is brought to the ultrasound suite and is placed in the flank position. Ultrasound gel was applied to his left flank and biplanar ultrasonography is carried out disclosing a normal size and contour of the kidney with a trace visualization of the intrarenal collecting system. This is consistent with a level of hydration and I do not see any evidence of residual ectasia. I reviewed the patient's chart. His PSA prior to the instrumentation was 1.4 and will almost certainly have come down. I am not going to do blood work at this point because the patient is due for his routine annual exam with his primary care doctor in April. The patient is also advised that he has not had a PSA done in 2 years. In 2015, it was 2.0. We have also talked extensively about implications of his stone disease. He has a mixed stone on recent stone analysis. We are in agreement that we are not going to proceed with metabolic testing in the presence of a normal serum calcium. Reasons for this are discussed and the patient and I are in agreement. He will return as needed    This visit exceeded 15 minutes, independent of the ultrasound and greater than 50% was counseling. The patient expresses understanding of the treatment plan and wishes to proceed    This dictation used voice recognition software and there may be mistakes.     Molly Monroe MD

## 2017-02-09 NOTE — PROGRESS NOTES
Mr. Luanne Murrell has a reminder for a \"due or due soon\" health maintenance. I have asked that he contact his primary care provider for follow-up on this health maintenance.

## 2017-02-09 NOTE — PATIENT INSTRUCTIONS
Kinematix Activation    Thank you for requesting access to [employer]. Please follow the instructions below to securely access and download your online medical record. Kinematix allows you to send messages to your doctor, view your test results, renew your prescriptions, schedule appointments, and more. How Do I Sign Up? 1. In your internet browser, go to [email or web address]. 2. Click on the First Time User? Click Here link in the Sign In box. You will see the New Member Sign Up page. 3. Enter your Kinematix Access Code exactly as it appears below. You will not need to use this code after youve completed the sign-up process. If you do not sign up before the expiration date, you must request a new code. Kinematix Access Code: [email or web address]  Expires: 5/10/2017  9:03 AM (This is the date your Kinematix access code will )    4. Enter the last four digits of your Social Security Number (xxxx) and Date of Birth (mm/dd/yyyy) as indicated and click Submit. You will be taken to the next sign-up page. 5. Create a Kinematix ID. This will be your Kinematix login ID and cannot be changed, so think of one that is secure and easy to remember. 6. Create a Kinematix password. You can change your password at any time. 7. Enter your Password Reset Question and Answer. This can be used at a later time if you forget your password. 8. Enter your e-mail address. You will receive e-mail notification when new information is available in [address]. 9. Click Sign Up. You can now view and download portions of your medical record. 10. Click the Download Summary menu link to download a portable copy of your medical information. Additional Information    If you have questions, please visit the Frequently Asked Questions section of the Kinematix website at [email or web address] Remember, Kinematix is NOT to be used for urgent needs. For medical emergencies, dial 911.            Kidney Stone: Care Instructions  Your Care Instructions    Kidney stones are formed when salts, minerals, and other substances normally found in the urine clump together. They can be as small as grains of sand or, rarely, as large as golf balls. While the stone is traveling through the ureter, which is the tube that carries urine from the kidney to the bladder, you will probably feel pain. The pain may be mild or very severe. You may also have some blood in your urine. As soon as the stone reaches the bladder, any intense pain should go away. If a stone is too large to pass on its own, you may need a medical procedure to help you pass the stone. The doctor has checked you carefully, but problems can develop later. If you notice any problems or new symptoms, get medical treatment right away. Follow-up care is a key part of your treatment and safety. Be sure to make and go to all appointments, and call your doctor if you are having problems. It's also a good idea to know your test results and keep a list of the medicines you take. How can you care for yourself at home? · Drink plenty of fluids, enough so that your urine is light yellow or clear like water. If you have kidney, heart, or liver disease and have to limit fluids, talk with your doctor before you increase the amount of fluids you drink. · Take pain medicines exactly as directed. Call your doctor if you think you are having a problem with your medicine. ¨ If the doctor gave you a prescription medicine for pain, take it as prescribed. ¨ If you are not taking a prescription pain medicine, ask your doctor if you can take an over-the-counter medicine. Read and follow all instructions on the label. · Your doctor may ask you to strain your urine so that you can collect your kidney stone when it passes. You can use a kitchen strainer or a tea strainer to catch the stone. Store it in a plastic bag until you see your doctor again.   Preventing future kidney stones  Some changes in your diet may help prevent kidney stones. Depending on the cause of your stones, your doctor may recommend that you:  · Drink plenty of fluids, enough so that your urine is light yellow or clear like water. If you have kidney, heart, or liver disease and have to limit fluids, talk with your doctor before you increase the amount of fluids you drink. · Limit coffee, tea, and alcohol. Also avoid grapefruit juice. · Do not take more than the recommended daily dose of vitamins C and D.  · Avoid antacids such as Gaviscon, Maalox, Mylanta, or Tums. · Limit the amount of salt (sodium) in your diet. · Eat a balanced diet that is not too high in protein. · Limit foods that are high in a substance called oxalate, which can cause kidney stones. These foods include dark green vegetables, rhubarb, chocolate, wheat bran, nuts, cranberries, and beans. When should you call for help? Call your doctor now or seek immediate medical care if:  · You cannot keep down fluids. · Your pain gets worse. · You have a fever or chills. · You have new or worse pain in your back just below your rib cage (the flank area). · You have new or more blood in your urine. Watch closely for changes in your health, and be sure to contact your doctor if:  · You do not get better as expected. Where can you learn more? Go to http://adam-andrei.info/. Enter M924 in the search box to learn more about \"Kidney Stone: Care Instructions. \"  Current as of: November 20, 2015  Content Version: 11.1  © 5743-6234 [employer]. Care instructions adapted under license by [employer] (which disclaims liability or warranty for this information). If you have questions about a medical condition or this instruction, always ask your healthcare professional. [address] any warranty or liability for your use of this information.

## 2017-02-14 ENCOUNTER — PATIENT OUTREACH (OUTPATIENT)
Dept: INTERNAL MEDICINE CLINIC | Age: 72
End: 2017-02-14

## 2017-02-14 NOTE — PROGRESS NOTES
Episode closed:   Patient admitted to [facility] for a scheduled procedure on 1/11/17 to 1/11/17 for cystoscopy ureteral stent removal. Attended follow up on 2/9/17 with Dr. Olegario Thomas (urology). No hospitalization or ED admission post 30 days from discharge of 1/11/17.

## 2017-04-21 ENCOUNTER — HOSPITAL ENCOUNTER (OUTPATIENT)
Dept: LAB | Age: 72
Discharge: HOME | End: 2017-04-21

## 2017-04-21 ENCOUNTER — LAB ONLY (OUTPATIENT)
Dept: INTERNAL MEDICINE CLINIC | Age: 72
End: 2017-04-21

## 2017-04-21 DIAGNOSIS — Z83.49 FHX: HYPOTHYROIDISM: ICD-10-CM

## 2017-04-21 DIAGNOSIS — E78.5 HYPERLIPIDEMIA LDL GOAL <100: ICD-10-CM

## 2017-04-21 DIAGNOSIS — I10 ESSENTIAL HYPERTENSION WITH GOAL BLOOD PRESSURE LESS THAN 140/90: Primary | ICD-10-CM

## 2017-04-21 PROCEDURE — 99001 SPECIMEN HANDLING PT-LAB: CPT | Performed by: INTERNAL MEDICINE

## 2017-04-22 LAB
ALBUMIN SERPL-MCNC: 4.3 G/DL (ref 3.5–4.8)
ALBUMIN/GLOB SERPL: 2 {RATIO} (ref 1.2–2.2)
ALP SERPL-CCNC: 61 IU/L (ref 39–117)
ALT SERPL-CCNC: 13 IU/L (ref 0–44)
APPEARANCE UR: CLEAR
AST SERPL-CCNC: 23 IU/L (ref 0–40)
BASOPHILS # BLD AUTO: 0 X10E3/UL (ref 0–0.2)
BASOPHILS NFR BLD AUTO: 0 %
BILIRUB SERPL-MCNC: 0.4 MG/DL (ref 0–1.2)
BILIRUB UR QL STRIP: NEGATIVE
BUN SERPL-MCNC: 25 MG/DL (ref 8–27)
BUN/CREAT SERPL: 18 (ref 10–24)
CALCIUM SERPL-MCNC: 9.4 MG/DL (ref 8.6–10.2)
CHLORIDE SERPL-SCNC: 102 MMOL/L (ref 96–106)
CHOLEST SERPL-MCNC: 175 MG/DL (ref 100–199)
CO2 SERPL-SCNC: 26 MMOL/L (ref 18–29)
COLOR UR: YELLOW
CREAT SERPL-MCNC: 1.41 MG/DL (ref 0.76–1.27)
EOSINOPHIL # BLD AUTO: 0.1 X10E3/UL (ref 0–0.4)
EOSINOPHIL NFR BLD AUTO: 2 %
ERYTHROCYTE [DISTWIDTH] IN BLOOD BY AUTOMATED COUNT: 15.2 % (ref 12.3–15.4)
GLOBULIN SER CALC-MCNC: 2.2 G/DL (ref 1.5–4.5)
GLUCOSE SERPL-MCNC: 87 MG/DL (ref 65–99)
GLUCOSE UR QL: NEGATIVE
HCT VFR BLD AUTO: 38.4 % (ref 37.5–51)
HDLC SERPL-MCNC: 46 MG/DL
HGB BLD-MCNC: 12.5 G/DL (ref 12.6–17.7)
HGB UR QL STRIP: NEGATIVE
IMM GRANULOCYTES # BLD: 0 X10E3/UL (ref 0–0.1)
IMM GRANULOCYTES NFR BLD: 0 %
INTERPRETATION, 910389: NORMAL
INTERPRETATION: NORMAL
KETONES UR QL STRIP: NEGATIVE
LDLC SERPL CALC-MCNC: 98 MG/DL (ref 0–99)
LEUKOCYTE ESTERASE UR QL STRIP: NEGATIVE
LYMPHOCYTES # BLD AUTO: 1.3 X10E3/UL (ref 0.7–3.1)
LYMPHOCYTES NFR BLD AUTO: 21 %
MCH RBC QN AUTO: 29.3 PG (ref 26.6–33)
MCHC RBC AUTO-ENTMCNC: 32.6 G/DL (ref 31.5–35.7)
MCV RBC AUTO: 90 FL (ref 79–97)
MICRO URNS: NORMAL
MONOCYTES # BLD AUTO: 0.6 X10E3/UL (ref 0.1–0.9)
MONOCYTES NFR BLD AUTO: 9 %
NEUTROPHILS # BLD AUTO: 4.1 X10E3/UL (ref 1.4–7)
NEUTROPHILS NFR BLD AUTO: 68 %
NITRITE UR QL STRIP: NEGATIVE
PDF IMAGE, 910387: NORMAL
PH UR STRIP: 6 [PH] (ref 5–7.5)
PLATELET # BLD AUTO: 203 X10E3/UL (ref 150–379)
POTASSIUM SERPL-SCNC: 4.1 MMOL/L (ref 3.5–5.2)
PROT SERPL-MCNC: 6.5 G/DL (ref 6–8.5)
PROT UR QL STRIP: NEGATIVE
RBC # BLD AUTO: 4.27 X10E6/UL (ref 4.14–5.8)
SODIUM SERPL-SCNC: 144 MMOL/L (ref 134–144)
SP GR UR: 1.02 (ref 1–1.03)
TRIGL SERPL-MCNC: 157 MG/DL (ref 0–149)
TSH SERPL DL<=0.005 MIU/L-ACNC: 2.47 UIU/ML (ref 0.45–4.5)
UROBILINOGEN UR STRIP-MCNC: 0.2 MG/DL (ref 0.2–1)
VLDLC SERPL CALC-MCNC: 31 MG/DL (ref 5–40)
WBC # BLD AUTO: 6.2 X10E3/UL (ref 3.4–10.8)

## 2017-04-28 ENCOUNTER — OFFICE VISIT (OUTPATIENT)
Dept: INTERNAL MEDICINE CLINIC | Age: 72
End: 2017-04-28

## 2017-04-28 VITALS
HEART RATE: 80 BPM | BODY MASS INDEX: 26.35 KG/M2 | RESPIRATION RATE: 12 BRPM | TEMPERATURE: 98.5 F | SYSTOLIC BLOOD PRESSURE: 118 MMHG | WEIGHT: 188.2 LBS | OXYGEN SATURATION: 98 % | HEIGHT: 71 IN | DIASTOLIC BLOOD PRESSURE: 68 MMHG

## 2017-04-28 DIAGNOSIS — E78.5 HYPERLIPIDEMIA LDL GOAL <100: ICD-10-CM

## 2017-04-28 DIAGNOSIS — Z00.00 ROUTINE GENERAL MEDICAL EXAMINATION AT A HEALTH CARE FACILITY: Primary | ICD-10-CM

## 2017-04-28 DIAGNOSIS — I10 ESSENTIAL HYPERTENSION WITH GOAL BLOOD PRESSURE LESS THAN 140/90: ICD-10-CM

## 2017-04-28 RX ORDER — IBUPROFEN 800 MG/1
800 TABLET ORAL
Qty: 90 TAB | Refills: 2 | Status: SHIPPED | OUTPATIENT
Start: 2017-04-28 | End: 2017-12-18 | Stop reason: ALTCHOICE

## 2017-04-28 NOTE — PROGRESS NOTES
Christiano Sherman 1945, is a 80 y.o. male, who is seen today for a routine physical exam and follow-up on hypertension hyperlipidemia fairly recent kidney stones. Since removal of a large kidney stone he has done well with no further pain. He does have some arthritic symptoms and uses ibuprofen 800 mg occasionally for those. He takes his cholesterol and blood pressure medicines regularly and is following a very healthy diet. He runs 4 miles at a time. Past Medical History:   Diagnosis Date    Asbestos exposure     FHx: hypothyroidism     GERD (gastroesophageal reflux disease)     Hyperlipidemia     Hypertension     Idiopathic hypersomnia     Nephrolithiasis     Unspecified sleep apnea     no C-Pap     Past Surgical History:   Procedure Laterality Date    HX COLONOSCOPY  4/8/14    10 yrs    HX ORTHOPAEDIC Right 10/27/2016    rotator cuff repair    HX TONSILLECTOMY      HX UROLOGICAL  12/14/2016    Stent placement     Current Outpatient Prescriptions   Medication Sig Dispense Refill    ibuprofen (MOTRIN) 800 mg tablet Take 1 Tab by mouth every six (6) hours as needed for Pain. 90 Tab 2    lovastatin (MEVACOR) 40 mg tablet TAKE 1 TABLET EVERY NIGHT 90 Tab 3    lisinopril-hydrochlorothiazide (PRINZIDE, ZESTORETIC) 20-25 mg per tablet Take 1 Tab by mouth daily.  90 Tab 3     No Known Allergies  Social History     Social History    Marital status:      Spouse name: N/A    Number of children: N/A    Years of education: N/A     Social History Main Topics    Smoking status: Never Smoker    Smokeless tobacco: Never Used    Alcohol use No    Drug use: No    Sexual activity: Yes     Partners: Female     Other Topics Concern    None     Social History Narrative     Visit Vitals    /68    Pulse 80    Temp 98.5 °F (36.9 °C) (Oral)    Resp 12    Ht 5' 11\" (1.803 m)    Wt 188 lb 3.2 oz (85.4 kg)    SpO2 98%    BMI 26.25 kg/m2     The patient is a well-developed, well-nourished male in no apparent distress. HEENT: Pupils are equal and react to light and extraocular movements are full. Ear canals and tympanic membranes appear normal. Oral cavity appears normal with no oral lesions. Neck: Carotids are 2+ without bruits. No adenopathy or thyromegaly. Lungs are clear to percussion. I hear no wheezing, rales or rhonchi. Heart reveals a nondisplaced apical impulse in the fifth interspace at the midclavicular line. Rhythm is regular and no murmur, gallop, click or rub. Abdomen is soft and nontender with no hepatosplenomegaly or masses. No distention or tympany and normoactive bowel sounds. Extremities reveal no clubbing cyanosis or edema. Pulses are 2+ throughout. Normal external genitalia with no hernia. Rectal exam is normal.  The prostate is symmetric and smooth with no nodules. No suspicious skin growths. Results for orders placed or performed in visit on 04/21/17   CBC WITH AUTOMATED DIFF   Result Value Ref Range    WBC 6.2 3.4 - 10.8 x10E3/uL    RBC 4.27 4. 14 - 5.80 x10E6/uL    HGB 12.5 (L) 12.6 - 17.7 g/dL    HCT 38.4 37.5 - 51.0 %    MCV 90 79 - 97 fL    MCH 29.3 26.6 - 33.0 pg    MCHC 32.6 31.5 - 35.7 g/dL    RDW 15.2 12.3 - 15.4 %    PLATELET 607 441 - 729 x10E3/uL    NEUTROPHILS 68 %    Lymphocytes 21 %    MONOCYTES 9 %    EOSINOPHILS 2 %    BASOPHILS 0 %    ABS. NEUTROPHILS 4.1 1.4 - 7.0 x10E3/uL    Abs Lymphocytes 1.3 0.7 - 3.1 x10E3/uL    ABS. MONOCYTES 0.6 0.1 - 0.9 x10E3/uL    ABS. EOSINOPHILS 0.1 0.0 - 0.4 x10E3/uL    ABS. BASOPHILS 0.0 0.0 - 0.2 x10E3/uL    IMMATURE GRANULOCYTES 0 %    ABS. IMM.  GRANS. 0.0 0.0 - 0.1 Q34E2/AC   METABOLIC PANEL, COMPREHENSIVE   Result Value Ref Range    Glucose 87 65 - 99 mg/dL    BUN 25 8 - 27 mg/dL    Creatinine 1.41 (H) 0.76 - 1.27 mg/dL    GFR est non-AA 50 (L) >59 mL/min/1.73    GFR est AA 58 (L) >59 mL/min/1.73    BUN/Creatinine ratio 18 10 - 24    Sodium 144 134 - 144 mmol/L    Potassium 4.1 3.5 - 5.2 mmol/L    Chloride 102 96 - 106 mmol/L    CO2 26 18 - 29 mmol/L    Calcium 9.4 8.6 - 10.2 mg/dL    Protein, total 6.5 6.0 - 8.5 g/dL    Albumin 4.3 3.5 - 4.8 g/dL    GLOBULIN, TOTAL 2.2 1.5 - 4.5 g/dL    A-G Ratio 2.0 1.2 - 2.2    Bilirubin, total 0.4 0.0 - 1.2 mg/dL    Alk. phosphatase 61 39 - 117 IU/L    AST (SGOT) 23 0 - 40 IU/L    ALT (SGPT) 13 0 - 44 IU/L   URINALYSIS W/ RFLX MICROSCOPIC   Result Value Ref Range    Specific Gravity 1.025 1.005 - 1.030    pH (UA) 6.0 5.0 - 7.5    Color Yellow Yellow    Appearance Clear Clear    Leukocyte Esterase Negative Negative    Protein Negative Negative/Trace    Glucose Negative Negative    Ketone Negative Negative    Blood Negative Negative    Bilirubin Negative Negative    Urobilinogen 0.2 0.2 - 1.0 mg/dL    Nitrites Negative Negative    Microscopic Examination Comment    LIPID PANEL   Result Value Ref Range    Cholesterol, total 175 100 - 199 mg/dL    Triglyceride 157 (H) 0 - 149 mg/dL    HDL Cholesterol 46 >39 mg/dL    VLDL, calculated 31 5 - 40 mg/dL    LDL, calculated 98 0 - 99 mg/dL   CVD REPORT   Result Value Ref Range    INTERPRETATION NTAP     PDF IMAGE Not applicable    CKD REPORT   Result Value Ref Range    Interpretation Note    TSH 3RD GENERATION   Result Value Ref Range    TSH 2.470 0.450 - 4.500 uIU/mL     Assessment: #1. Hyperlipidemia doing well. Continue lovastatin 40 mg each evening and healthy diet. #2. Hypertension very well controlled. We will continue Prinzide 1 daily. #3.  History of nephrolithiasis doing well, he will keep up on fluids. Follow-up in 1 year for physical or sooner if needed    Nakul Lund MD FACP    Please note: This document has been produced using voice recognition software. Unrecognized errors in transcription may be present.

## 2017-06-13 ENCOUNTER — TELEPHONE (OUTPATIENT)
Dept: NEUROLOGY | Age: 72
End: 2017-06-13

## 2017-06-13 NOTE — TELEPHONE ENCOUNTER
Pt states does not wish to come in to see doctor but would like to have mask ordered for CPAP. States has not used his CPAP in long time and is exhausted. Attempted to have pt set appointment. Pt determined not see doctor. Would appreciate a phone call either way please.   [phone]

## 2017-06-13 NOTE — TELEPHONE ENCOUNTER
Returned patient call, LILIBETH to call our office to schedule an appointment for follow-up. See Dr. Mendoza Press note.

## 2017-06-15 ENCOUNTER — OFFICE VISIT (OUTPATIENT)
Dept: NEUROLOGY | Age: 72
End: 2017-06-15

## 2017-06-15 VITALS
TEMPERATURE: 97.9 F | OXYGEN SATURATION: 98 % | HEIGHT: 71 IN | SYSTOLIC BLOOD PRESSURE: 128 MMHG | HEART RATE: 61 BPM | RESPIRATION RATE: 18 BRPM | DIASTOLIC BLOOD PRESSURE: 70 MMHG | WEIGHT: 188 LBS | BODY MASS INDEX: 26.32 KG/M2

## 2017-06-15 DIAGNOSIS — G47.30 HYPERSOMNIA WITH SLEEP APNEA: Primary | ICD-10-CM

## 2017-06-15 DIAGNOSIS — G47.33 OSA (OBSTRUCTIVE SLEEP APNEA): ICD-10-CM

## 2017-06-15 DIAGNOSIS — R06.83 SNORING: ICD-10-CM

## 2017-06-15 DIAGNOSIS — G47.10 HYPERSOMNIA WITH SLEEP APNEA: Primary | ICD-10-CM

## 2017-06-15 DIAGNOSIS — I10 ESSENTIAL HYPERTENSION: ICD-10-CM

## 2017-06-15 DIAGNOSIS — I10 ESSENTIAL HYPERTENSION WITH GOAL BLOOD PRESSURE LESS THAN 130/80: ICD-10-CM

## 2017-06-15 NOTE — MR AVS SNAPSHOT
Visit Information Date & Time Provider Department Dept. Phone Encounter #  
 6/15/2017  1:45 PM Maggie Mcgill  [facility] [address] [address] [id number] Follow-up Instructions Return in about 6 weeks (around 7/27/2017). Follow-up and Disposition History Upcoming Health Maintenance Date Due  
 MEDICARE YEARLY EXAM 10/31/2011 GLAUCOMA SCREENING Q2Y 6/17/2016 INFLUENZA AGE 9 TO ADULT 8/1/2017 COLONOSCOPY 4/8/2024 DTaP/Tdap/Td series (2 - Td) 4/25/2026 Allergies as of 6/15/2017  Review Complete On: 6/15/2017 By: Maggie Mcgill MD  
 No Known Allergies Current Immunizations  Reviewed on 9/27/2016 Name Date H1N1 FLU VACCINE 1/6/2010 Influenza High Dose Vaccine PF 9/27/2016  4:46 PM  
 Influenza Vaccine PF 10/4/2013 Influenza Vaccine Split 11/26/2012 Influenza Vaccine Whole 9/23/2011 Pneumococcal Conjugate (PCV-13) 9/27/2016  5:07 PM  
 Pneumococcal Vaccine (Unspecified Type) 11/26/2012 Tdap 4/25/2016 12:56 PM  
 Zoster 8/26/2011 Not reviewed this visit You Were Diagnosed With   
  
 Codes Comments Hypersomnia with sleep apnea    -  Primary ICD-10-CM: G47.10, G47.30 ICD-9-CM: 780.53 Essential hypertension with goal blood pressure less than 130/80     ICD-10-CM: I10 
ICD-9-CM: 401.9 Snoring     ICD-10-CM: R06.83 
ICD-9-CM: 786.09 Essential hypertension     ICD-10-CM: I10 
ICD-9-CM: 401.9 DESIREE (obstructive sleep apnea)     ICD-10-CM: G47.33 
ICD-9-CM: 327.23 Vitals BP Pulse Temp Resp Height(growth percentile) Weight(growth percentile) 128/70 61 97.9 °F (36.6 °C) (Oral) 18 5' 11\" (1.803 m) 188 lb (85.3 kg) SpO2 BMI Smoking Status 98% 26.22 kg/m2 Never Smoker BMI and BSA Data Body Mass Index Body Surface Area  
 26.22 kg/m 2 2.07 m 2 Preferred Pharmacy Pharmacy Name Phone  [address] [facility] [phone] Your Updated Medication List  
  
   
This list is accurate as of: 6/15/17  2:39 PM.  Always use your most recent med list.  
  
  
  
  
 ibuprofen 800 mg tablet Commonly known as:  MOTRIN Take 1 Tab by mouth every six (6) hours as needed for Pain. lisinopril-hydroCHLOROthiazide 20-25 mg per tablet Commonly known as:  [place] Take 1 Tab by mouth daily. lovastatin 40 mg tablet Commonly known as:  MEVACOR  
TAKE 1 TABLET EVERY NIGHT We Performed the Following AMB SUPPLY ORDER [[id number] Custom] Comments: CPAP at 5-15cm H2O for lifetime use Heated humidification Mask of patient preference, PAP supplies, modem Download to include: usage, leakage, AHI in three weeks Dx. Obstructive sleep apnea Follow-up Instructions Return in about 6 weeks (around 7/27/2017). Introducing \[facility] [place] [facility]\"" & HEALTH SERVICES! [facility] introduces MentorDOTMe patient portal. Now you can access parts of your medical record, email your doctor's office, and request medication refills online. 1. In your internet browser, go to [email or web address] 2. Click on the First Time User? Click Here link in the Sign In box. You will see the New Member Sign Up page. 3. Enter your MentorDOTMe Access Code exactly as it appears below. You will not need to use this code after youve completed the sign-up process. If you do not sign up before the expiration date, you must request a new code. · MentorDOTMe Access Code: [email or web address] Expires: 9/13/2017  1:48 PM 
 
4. Enter the last four digits of your Social Security Number (xxxx) and Date of Birth (mm/dd/yyyy) as indicated and click Submit. You will be taken to the next sign-up page. 5. Create a MentorDOTMe ID. This will be your MentorDOTMe login ID and cannot be changed, so think of one that is secure and easy to remember. 6. Create a Arkami password. You can change your password at any time. 7. Enter your Password Reset Question and Answer. This can be used at a later time if you forget your password. 8. Enter your e-mail address. You will receive e-mail notification when new information is available in [address] 9. Click Sign Up. You can now view and download portions of your medical record. 10. Click the Download Summary menu link to download a portable copy of your medical information. If you have questions, please visit the Frequently Asked Questions section of the Arkami website. Remember, Arkami is NOT to be used for urgent needs. For medical emergencies, dial 911. Now available from your iPhone and Android! Please provide this summary of care documentation to your next provider. Your primary care clinician is listed as Ade Vance. Roxanna Lipscomb. If you have any questions after today's visit, please call [phone].

## 2017-06-15 NOTE — PROGRESS NOTES
[facility]             [address] [facility], [facility], [address] [place] [address]      Milka Batres is left handed [facility]  male who presents in evaluation of sleep disorder. Patient describes adolescence Onset was /gradual .Patient describes the patient describes a lifelong history of daytime drowsiness, worsening gradually. He has been on CPAP he had a sleep study showing only mild obstructive sleep apnea 2002. He was on CPAP, but felt no better. His weight has remained stable. He does not take daytime naps he drinks only one cup of coffee a day. He goes to bed at 9 PM takes seconds to fall asleep and then gets up at 5 AM he is awakened twice wants to urinate with awakenings lasting unknown length of time. He denies bruxism, violent leg movements but not had any accidents    Patient reports increasing daytime somnolence since not using the CPAP he is we willing to retry and if still cannot not tolerate mask will be willing to pay for oral appliance. Provided the name of Dr. Katja Clemens again. He has had marked difficulty with mask including leaks and noise. ESS=14/24 He does not wish to pay for provigil . Current Outpatient Prescriptions:     ibuprofen (MOTRIN) 800 mg tablet, Take 1 Tab by mouth every six (6) hours as needed for Pain., Disp: 90 Tab, Rfl: 2    lovastatin (MEVACOR) 40 mg tablet, TAKE 1 TABLET EVERY NIGHT, Disp: 90 Tab, Rfl: 3    lisinopril-hydrochlorothiazide (PRINZIDE, ZESTORETIC) 20-25 mg per tablet, Take 1 Tab by mouth daily. , Disp: 80 Tab, Rfl: 3  Past Medical History:   Diagnosis Date    Asbestos exposure     FHx: hypothyroidism     GERD (gastroesophageal reflux disease)     Hyperlipidemia     Hypertension     Idiopathic hypersomnia     Nephrolithiasis     Unspecified sleep apnea     no C-Pap     Social History     Social History    Marital status:      Spouse name: N/A    Number of children: N/A    Years of education: N/A Occupational History    Not on file. Social History Main Topics    Smoking status: Never Smoker    Smokeless tobacco: Never Used    Alcohol use No    Drug use: No    Sexual activity: Yes     Partners: Female     Other Topics Concern    Not on file     Social History Narrative       Review of Systems:   Constitutional:  there was no change in patient's weight. Eyes:  Negative blurred vision  CVS:  Negative chest pain  Pulm:  Negative shortness of breath  GI:  Negative nausea or vomiting  :  Positive nocturia  Musculoskeletal:  Negative significant joint pain at night  Skin:  Negative rashes  Neuro:  Negative dizziness   Psych:  Negative significant mood issues    Sleep Review of Systems: notable for Negative difficulty falling asleep; Positive awakenings at night; Negative percieved regular dreaming; Negative nightmares; Negative early morning headaches; 0 afternoon naps per week; Negative memory problems; Negative concentration issues; Negative history of any automobile or occupational accidents due to daytime drowsiness. Physical Exam    Visit Vitals    /70    Pulse 61    Temp 97.9 °F (36.6 °C) (Oral)    Resp 18    Ht 5' 11\" (1.803 m)    Wt 85.3 kg (188 lb)    SpO2 98%    BMI 26.22 kg/m2       Neck Circumference: 16.5 in      General:  Well defined, nourished, and groomed individual in no acute distress. HEENT: head :at/nc Throat:oropharnynx  Crowding noted  Neck: Supple, nontender, thyroid within normal limits, no JVD, no bruits, no pain   with resistance to active range of motion. Heart: Regular rate and rhythm, no murmurs, rub, or gallop. Normal S1S2. Lungs:  Clear to auscultation     Psych:  Good mood and normal affect    Neurologic Exam    Mental Status: The patient is awake, alert, and oriented x 3. Speech is fluent and memory appears to be intact, both long and short term. No aphasias or apraxias. Cranial Nerves: II - Visual fields are full to confrontation. y. Pupils are both equal and reactive to light and accommodation. III, IV, VI - Extraocular movements are intact and there is no nystagmus. V - Facial sensation is intact to pinprick and light touch. VII - Face is symmetrical.   VIII - Hearing is present. Motor: Tone is normal and symmetric. There is no pronator drift present. Coordination. Gait testing is normal  Clinton Sleepiness Scale14/24 Stop bang 6/8  Assessment and Plan  Rubén Garzon is a 80 y.o. left handed male whose history and physical are consistent with hypersomnolence with sleep apnea . Rubén Garzon who has risk factors including hypertension, sleep disorders,    Yuli Jones was seen today for sleep problem. Diagnoses and all orders for this visit:    Hypersomnia with sleep apnea  -     AMB SUPPLY ORDER    Essential hypertension with goal blood pressure less than 130/80  -     AMB SUPPLY ORDER    Snoring  -     AMB SUPPLY ORDER    Essential hypertension  -     AMB SUPPLY ORDER    DESIREE (obstructive sleep apnea)  -     AMB SUPPLY ORDER        Discussed various options includingreviewed risks and benefits as well oral appliances. No drowsy driving Will retry cpap as auto pap  I spent 30 minutes with the patient in face-to-face consultation, of which greater than 50% was spent in counseling and coordination of care as described above.

## 2017-06-26 ENCOUNTER — DOCUMENTATION ONLY (OUTPATIENT)
Dept: NEUROLOGY | Age: 72
End: 2017-06-26

## 2017-07-31 ENCOUNTER — TELEPHONE (OUTPATIENT)
Dept: INTERNAL MEDICINE CLINIC | Age: 72
End: 2017-07-31

## 2017-07-31 DIAGNOSIS — G47.33 OBSTRUCTIVE SLEEP APNEA: Primary | ICD-10-CM

## 2017-07-31 NOTE — TELEPHONE ENCOUNTER
Patient is requesting backdated [employer] referral to Dr. Stanton Sosa. DOS: 7/2/17   Dx.  Sleep Apnea    P: [phone]  F: [phone]

## 2017-08-01 NOTE — TELEPHONE ENCOUNTER
Miguel Angel Munoz at Dr. Kamila Velasco office is requesting referral for appt tomorrow. Does NOT need to be back dated as stated in previous message.     Dx: Obstructive Sleep apnea    Fax: [phone]

## 2017-08-25 ENCOUNTER — TELEPHONE (OUTPATIENT)
Dept: INTERNAL MEDICINE CLINIC | Age: 72
End: 2017-08-25

## 2017-08-25 NOTE — TELEPHONE ENCOUNTER
Patient calling said he saw on TV where the baby boomers were getting Hep C. Patient wanted to know how he would go about getting tested for the Hep C. Says he just had a friend who was rushed to the ER for it, and he wanted to make sure him and his wife didn't have it.

## 2017-10-11 ENCOUNTER — CLINICAL SUPPORT (OUTPATIENT)
Dept: INTERNAL MEDICINE CLINIC | Age: 72
End: 2017-10-11

## 2017-10-11 DIAGNOSIS — Z23 ENCOUNTER FOR IMMUNIZATION: Primary | ICD-10-CM

## 2017-11-16 ENCOUNTER — OFFICE VISIT (OUTPATIENT)
Dept: INTERNAL MEDICINE CLINIC | Age: 72
End: 2017-11-16

## 2017-11-16 VITALS
HEART RATE: 73 BPM | HEIGHT: 71 IN | OXYGEN SATURATION: 98 % | WEIGHT: 191 LBS | BODY MASS INDEX: 26.74 KG/M2 | RESPIRATION RATE: 12 BRPM | DIASTOLIC BLOOD PRESSURE: 66 MMHG | SYSTOLIC BLOOD PRESSURE: 110 MMHG | TEMPERATURE: 98.4 F

## 2017-11-16 DIAGNOSIS — M15.9 PRIMARY OSTEOARTHRITIS INVOLVING MULTIPLE JOINTS: ICD-10-CM

## 2017-11-16 DIAGNOSIS — K59.09 CHRONIC CONSTIPATION: Primary | ICD-10-CM

## 2017-11-16 DIAGNOSIS — K21.9 GASTROESOPHAGEAL REFLUX DISEASE WITHOUT ESOPHAGITIS: ICD-10-CM

## 2017-11-16 NOTE — MR AVS SNAPSHOT
Visit Information Date & Time Provider Department Dept. Phone Encounter #  
 11/16/2017 11:00 AM Pilar Hightower MD [facility] [phone] Upcoming Health Maintenance Date Due  
 MEDICARE YEARLY EXAM 10/31/2011 GLAUCOMA SCREENING Q2Y 6/17/2016 COLONOSCOPY 4/8/2024 DTaP/Tdap/Td series (2 - Td) 4/25/2026 Allergies as of 11/16/2017  Review Complete On: 11/16/2017 By: Pilar Hightower MD  
 No Known Allergies Current Immunizations  Reviewed on 9/27/2016 Name Date H1N1 FLU VACCINE 1/6/2010 Influenza High Dose Vaccine PF 10/11/2017  8:25 AM, 9/27/2016  4:46 PM  
 Influenza Vaccine PF 10/4/2013 Influenza Vaccine Split 11/26/2012 Influenza Vaccine Whole 9/23/2011 Pneumococcal Conjugate (PCV-13) 9/27/2016  5:07 PM  
 Tdap 4/25/2016 12:56 PM  
 ZZZ-RETIRED (DO NOT USE) Pneumococcal Vaccine (Unspecified Type) 11/26/2012 Zoster 8/26/2011 Not reviewed this visit Vitals BP Pulse Temp Resp Height(growth percentile) Weight(growth percentile) 110/66 73 98.4 °F (36.9 °C) (Oral) 12 5' 11\" (1.803 m) 191 lb (86.6 kg) SpO2 BMI Smoking Status 98% 26.64 kg/m2 Never Smoker Vitals History BMI and BSA Data Body Mass Index Body Surface Area  
 26.64 kg/m 2 2.08 m 2 Preferred Pharmacy Pharmacy Name Phone [address] Mercedes Escobar [address] [place] [address] [address] [facility] [phone] Your Updated Medication List  
  
   
This list is accurate as of: 11/16/17 12:01 PM.  Always use your most recent med list.  
  
  
  
  
 ibuprofen 800 mg tablet Commonly known as:  MOTRIN Take 1 Tab by mouth every six (6) hours as needed for Pain. lisinopril-hydroCHLOROthiazide 20-25 mg per tablet Commonly known as:  Flaca Mcardle Take 1 Tab by mouth daily. lovastatin 40 mg tablet Commonly known as:  MEVACOR  
TAKE 1 TABLET EVERY NIGHT Introducing [facility] & HE[facility] SERVICES! [facility] introduces ChartITright patient portal. Now you can access parts of your medical record, email your doctor's office, and request medication refills online. 1. In your internet browser, go to [email or web address] 2. Click on the First Time User? Click Here link in the Sign In box. You will see the New Member Sign Up page. 3. Enter your ChartITright Access Code exactly as it appears below. You will not need to use this code after youve completed the sign-up process. If you do not sign up before the expiration date, you must request a new code. · ChartITright Access Code: [email or web address] Expires: 2/14/2018 12:01 PM 
 
4. Enter the last four digits of your Social Security Number (xxxx) and Date of Birth (mm/dd/yyyy) as indicated and click Submit. You will be taken to the next sign-up page. 5. Create a ChartITright ID. This will be your ChartITright login ID and cannot be changed, so think of one that is secure and easy to remember. 6. Create a ChartITright password. You can change your password at any time. 7. Enter your Password Reset Question and Answer. This can be used at a later time if you forget your password. 8. Enter your e-mail address. You will receive e-mail notification when new information is available in [address] 9. Click Sign Up. You can now view and download portions of your medical record. 10. Click the Download Summary menu link to download a portable copy of your medical information. If you have questions, please visit the Frequently Asked Questions section of the ChartITright website. Remember, ChartITright is NOT to be used for urgent needs. For medical emergencies, dial 911. Now available from your iPhone and Android! Please provide this summary of care documentation to your next provider. Your primary care clinician is listed as Kam Quintanilla. If you have any questions after today's visit, please call [phone].

## 2017-11-16 NOTE — PROGRESS NOTES
Katelyn Whitfield 1945, is a 71 y.o. male, who is seen today for evaluation of bowel complaints, heartburn, arthritis. For the last 4 months he has had smaller harder bowel movements than previously and sometimes has gone as long as 1.5 weeks between bowel movements though usually not nearly this long. He has tried a suppository one time which helped him and over-the-counter agents have not been helpful but he has been reluctant to use anything. His diet has not really changed and his appetite is good. He also notes that he sometimes gets heartburn particularly if eating late and particularly with eating eggs or spaghetti. Usually Tums either 1 or 2 relieves the symptoms but occasionally he has used 3 or 4 without a whole lot of benefit. Symptoms usually lasts minutes and are never brought on by activity, almost always occur in the late evening. He also has arthritis in his hands and when he does use Motrin 800 mg it helps a great deal but he prefers not to use medication most of the time. When he first gets up in the morning his fingers are stiff and loosen up over the course of 15-30 minutes typically. He has had no injuries to his hands and the arthritis has come on gradually over the last several years. Past Medical History:   Diagnosis Date    Asbestos exposure     FHx: hypothyroidism     GERD (gastroesophageal reflux disease)     Hyperlipidemia     Hypertension     Idiopathic hypersomnia     Nephrolithiasis     Unspecified sleep apnea     no C-Pap     Current Outpatient Prescriptions   Medication Sig Dispense Refill    ibuprofen (MOTRIN) 800 mg tablet Take 1 Tab by mouth every six (6) hours as needed for Pain. 90 Tab 2    lovastatin (MEVACOR) 40 mg tablet TAKE 1 TABLET EVERY NIGHT 90 Tab 3    lisinopril-hydrochlorothiazide (PRINZIDE, ZESTORETIC) 20-25 mg per tablet Take 1 Tab by mouth daily.  90 Tab 3     Visit Vitals    /66    Pulse 73    Temp 98.4 °F (36.9 °C) (Oral)  Resp 12    Ht 5' 11\" (1.803 m)    Wt 191 lb (86.6 kg)    SpO2 98%    BMI 26.64 kg/m2     Heart reveals a regular rhythm with normal S1 and S2 no murmur gallop click or rub. Lungs are clear to percussion. Good breath sounds with no wheezing or crackles. Abdomen is not distended and there is no tympany. No hepatosplenomegaly or masses and no bruits. There is no tenderness anywhere in the abdomen and bone tenderness. His hands reveals some mild degenerative changes in the PIP joints of several of his fingers but much less so in the DIP or MP joints. Full range of motion with good  strength bilaterally. Some discomfort with flexion of the thumbs. Assessment: #1. Constipation seems to be exacerbation of normal physiology, he had a negative colonoscopy in April 2014. I recommended that he use polyethylene glycol 1 capful daily in liquid and every few days increase or decrease the dose to try to regularize his bowel movements. I recommended he not change the dose any more often than every 3-4 days and to call me if not doing well. #2.  GERD symptoms as above, have recommended that he use Tums, anywhere from 1-4 as needed to relieve symptoms when they are they are already but if he is going to eat something suspect like spaghetti he should use either ranitidine or famotidine or omeprazole and lisinopril hour before the meal to try to prevent symptoms. #3.  DJD both hands, clearly this is DJD and not rheumatoid arthritis or other inflammatory arthropathy. Recommended that he try to keep his hands as flexible as possible in the strongest possible and to use ibuprofen 800 mg only on days he needs it. I have explained to him that stronger drugs advertised on TV are not the type of drug it is recommended for DJD and that seen a rheumatologist will not be helpful for him. Follow-up as previously planned or sooner if needed    Nakul Last MD FACP    Please note:   This document has been produced using voice recognition software. Unrecognized errors in transcription may be present.

## 2017-12-05 RX ORDER — LISINOPRIL AND HYDROCHLOROTHIAZIDE 20; 25 MG/1; MG/1
TABLET ORAL
Qty: 90 TAB | Refills: 3 | Status: SHIPPED | OUTPATIENT
Start: 2017-12-05

## 2017-12-05 RX ORDER — LOVASTATIN 40 MG/1
TABLET ORAL
Qty: 90 TAB | Refills: 3 | Status: SHIPPED | OUTPATIENT
Start: 2017-12-05

## 2017-12-11 ENCOUNTER — OFFICE VISIT (OUTPATIENT)
Dept: INTERNAL MEDICINE CLINIC | Age: 72
End: 2017-12-11

## 2017-12-11 VITALS
HEART RATE: 80 BPM | HEIGHT: 71 IN | TEMPERATURE: 98.8 F | WEIGHT: 194 LBS | RESPIRATION RATE: 16 BRPM | BODY MASS INDEX: 27.16 KG/M2 | DIASTOLIC BLOOD PRESSURE: 70 MMHG | OXYGEN SATURATION: 98 % | SYSTOLIC BLOOD PRESSURE: 100 MMHG

## 2017-12-11 DIAGNOSIS — L72.0 EPIDERMAL INCLUSION CYST: Primary | ICD-10-CM

## 2017-12-11 RX ORDER — SULFAMETHOXAZOLE AND TRIMETHOPRIM 800; 160 MG/1; MG/1
1 TABLET ORAL 2 TIMES DAILY
Qty: 20 TAB | Refills: 0 | Status: SHIPPED | OUTPATIENT
Start: 2017-12-11 | End: 2017-12-18 | Stop reason: SINTOL

## 2017-12-11 NOTE — PROGRESS NOTES
Chief Complaint   Patient presents with    Groin Swelling     Patient states 3 days ago he got a lump on his right upper groin. Patient states that it hurts when he walks and it is weeping and it has a smell. ROOM 9       1. Have you been to the ER, urgent care clinic or hospitalized since your last visit? NO.     2. Have you seen or consulted any other health care providers outside of the [address] [place] [address] since your last visit (Include any pap smears or colon screening)?  NO

## 2017-12-11 NOTE — MR AVS SNAPSHOT
Visit Information Date & Time Provider Department Dept. Phone Encounter #  
 12/11/2017  2:30 PM Sim Velazco MD [facility] [phone] [id number] Upcoming Health Maintenance Date Due  
 MEDICARE YEARLY EXAM 10/31/2011 GLAUCOMA SCREENING Q2Y 6/17/2016 COLONOSCOPY 4/8/2024 DTaP/Tdap/Td series (2 - Td) 4/25/2026 Allergies as of 12/11/2017  Review Complete On: 12/11/2017 By: Sim Velazco MD  
 No Known Allergies Current Immunizations  Reviewed on 9/27/2016 Name Date H1N1 FLU VACCINE 1/6/2010 Influenza High Dose Vaccine PF 10/11/2017  8:25 AM, 9/27/2016  4:46 PM  
 Influenza Vaccine PF 10/4/2013 Influenza Vaccine Split 11/26/2012 Influenza Vaccine Whole 9/23/2011 Pneumococcal Conjugate (PCV-13) 9/27/2016  5:07 PM  
 Tdap 4/25/2016 12:56 PM  
 ZZZ-RETIRED (DO NOT USE) Pneumococcal Vaccine (Unspecified Type) 11/26/2012 Zoster 8/26/2011 Not reviewed this visit You Were Diagnosed With   
  
 Codes Comments Epidermal inclusion cyst    -  Primary ICD-10-CM: L72.0 ICD-9-CM: 706. 2 Vitals BP Pulse Temp Resp Height(growth percentile) Weight(growth percentile) 100/70 (BP 1 Location: Left arm, BP Patient Position: Sitting) 80 98.8 °F (37.1 °C) (Oral) 16 5' 11\" (1.803 m) 194 lb (88 kg) SpO2 BMI Smoking Status 98% 27.06 kg/m2 Never Smoker Vitals History BMI and BSA Data Body Mass Index Body Surface Area  
 27.06 kg/m 2 2.1 m 2 Preferred Pharmacy Pharmacy Name Phone [address] [facility] [address] [place] [address] [phone] Your Updated Medication List  
  
   
This list is accurate as of: 12/11/17  3:08 PM.  Always use your most recent med list.  
  
  
  
  
 ibuprofen 800 mg tablet Commonly known as:  MOTRIN Take 1 Tab by mouth every six (6) hours as needed for Pain. lisinopril-hydroCHLOROthiazide 20-25 mg per tablet Commonly known as:  PRINZIDE, ZESTORETIC  
TAKE 1 TABLET EVERY DAY  
  
 lovastatin 40 mg tablet Commonly known as:  MEVACOR  
TAKE 1 TABLET EVERY NIGHT  
  
 trimethoprim-sulfamethoxazole 160-800 mg per tablet Commonly known as:  BACTRIM DS Take 1 Tab by mouth two (2) times a day. Prescriptions Printed Refills  
 trimethoprim-sulfamethoxazole (BACTRIM DS) 160-800 mg per tablet 0 Sig: Take 1 Tab by mouth two (2) times a day. Class: Print Route: Oral  
  
Introducing [facility] & HE[facility] SERVICES! Yumiko Padilla introduces Little1 patient portal. Now you can access parts of your medical record, email your doctor's office, and request medication refills online. 1. In your internet browser, go to [email or web address] 2. Click on the First Time User? Click Here link in the Sign In box. You will see the New Member Sign Up page. 3. Enter your Little1 Access Code exactly as it appears below. You will not need to use this code after youve completed the sign-up process. If you do not sign up before the expiration date, you must request a new code. · Little1 Access Code: [email or web address] Expires: 2/14/2018 12:01 PM 
 
4. Enter the last four digits of your Social Security Number (xxxx) and Date of Birth (mm/dd/yyyy) as indicated and click Submit. You will be taken to the next sign-up page. 5. Create a Little1 ID. This will be your Little1 login ID and cannot be changed, so think of one that is secure and easy to remember. 6. Create a Little1 password. You can change your password at any time. 7. Enter your Password Reset Question and Answer. This can be used at a later time if you forget your password. 8. Enter your e-mail address. You will receive e-mail notification when new information is available in [address] 9. Click Sign Up. You can now view and download portions of your medical record.  
10. Click the Download Summary menu link to download a portable copy of your medical information. If you have questions, please visit the Frequently Asked Questions section of the [employer]gen[employer] website. Remember, Agennix is NOT to be used for urgent needs. For medical emergencies, dial 911. Now available from your iPhone and Android! Please provide this summary of care documentation to your next provider. Your primary care clinician is listed as Timi Pacheco. Ana Cristina Good. If you have any questions after today's visit, please call [phone].

## 2017-12-11 NOTE — PROGRESS NOTES
Zuleima Andrade 1945, is a 71 y.o. male, who is seen today for a tender swollen area in the right groin. Yesterday morning when he awakened he noted something a little sore and moist in his right groin and it has gotten a little more sore today particularly with walking he notices it, yesterday it was just a tender area that did not hurt mostly pressed on it. He has had no chills sweats or fever. Past Medical History:   Diagnosis Date    Asbestos exposure     FHx: hypothyroidism     GERD (gastroesophageal reflux disease)     Hyperlipidemia     Hypertension     Idiopathic hypersomnia     Nephrolithiasis     Unspecified sleep apnea     no C-Pap     Current Outpatient Prescriptions   Medication Sig Dispense Refill    lisinopril-hydroCHLOROthiazide (PRINZIDE, ZESTORETIC) 20-25 mg per tablet TAKE 1 TABLET EVERY DAY 90 Tab 3    lovastatin (MEVACOR) 40 mg tablet TAKE 1 TABLET EVERY NIGHT 90 Tab 3    ibuprofen (MOTRIN) 800 mg tablet Take 1 Tab by mouth every six (6) hours as needed for Pain. 90 Tab 2     Visit Vitals    /70 (BP 1 Location: Left arm, BP Patient Position: Sitting)    Pulse 80    Temp 98.8 °F (37.1 °C) (Oral)    Resp 16    Ht 5' 11\" (1.803 m)    Wt 194 lb (88 kg)    SpO2 98%    BMI 27.06 kg/m2     In the right inguinal area there is a 3 x 4 cm area of induration, the area of redness is only about 1 cm x 2 cm, along the inguinal crease. There is mildly to moderately tender in a cannot detect any drainage whatsoever. It is not coming to ahead. I do not see any dark oxidized areas around hair follicles. Rest of the thigh and abdomen are unremarkable. Assessment: There is an indurated inflamed area that could be infected but may well just be inflamed, this could be an inflamed epidermal inclusion cyst or less likely an abscess based on how fast it came up. It is not coming to ahead. He will treated with warm compresses with warm washcloth and Septra DS for 10 days. If it is getting worse instead of better in terms of discomfort in size I will have him see a surgeon or if we cannot do that expeditiously I will send him to the emergency room. He agrees with this plan. Nakul Mendez MD FACP    Please note: This document has been produced using voice recognition software. Unrecognized errors in transcription may be present.

## 2017-12-18 ENCOUNTER — OFFICE VISIT (OUTPATIENT)
Dept: INTERNAL MEDICINE CLINIC | Age: 72
End: 2017-12-18

## 2017-12-18 VITALS
HEART RATE: 74 BPM | TEMPERATURE: 98 F | BODY MASS INDEX: 27.44 KG/M2 | SYSTOLIC BLOOD PRESSURE: 122 MMHG | OXYGEN SATURATION: 97 % | HEIGHT: 71 IN | WEIGHT: 196 LBS | RESPIRATION RATE: 18 BRPM | DIASTOLIC BLOOD PRESSURE: 72 MMHG

## 2017-12-18 DIAGNOSIS — L72.0 EPIDERMAL INCLUSION CYST: Primary | ICD-10-CM

## 2017-12-18 NOTE — PROGRESS NOTES
Yanely Gutierrez 1945, is a 71 y.o. male, who is seen today for reevaluation of inflamed area in the right groin. I saw him one week ago for this problem and it has come up very quickly, over the course of 24 hours or so. He has been soaking it in the time since washcloth gets cold so quickly he has been using some topical medication and did see some weeping from this area one day and he has been trying to use a sulfa-based antibiotic and I gave him but it causes so much nausea he is taking it many days just once a day instead of twice. The area has gotten better but he is going out of town in a few days and wanted to check back and see what to do next. Past Medical History:   Diagnosis Date    Asbestos exposure     FHx: hypothyroidism     GERD (gastroesophageal reflux disease)     Hyperlipidemia     Hypertension     Idiopathic hypersomnia     Nephrolithiasis     Unspecified sleep apnea     no C-Pap     Current Outpatient Prescriptions   Medication Sig Dispense Refill    trimethoprim-sulfamethoxazole (BACTRIM DS) 160-800 mg per tablet Take 1 Tab by mouth two (2) times a day. 20 Tab 0    lisinopril-hydroCHLOROthiazide (PRINZIDE, ZESTORETIC) 20-25 mg per tablet TAKE 1 TABLET EVERY DAY 90 Tab 3    lovastatin (MEVACOR) 40 mg tablet TAKE 1 TABLET EVERY NIGHT 90 Tab 3     Visit Vitals    /72 (BP 1 Location: Right arm, BP Patient Position: Sitting)    Pulse 74    Temp 98 °F (36.7 °C) (Oral)    Resp 18    Ht 5' 11\" (1.803 m)    Wt 196 lb (88.9 kg)    SpO2 97%    BMI 27.34 kg/m2     In the right inguinal area the area of redness and induration is much smaller and centrally it seems to be coming to ahead. I did squeeze from the periphery and got out a moderate amount of thick white material to the point where there was none left in the cavity. There was no bleeding or other drainage after I finished but there was an open area centrally.     I did cover the area with some Kleenex which is held in place by his underwear. I have asked him to do normal showers and bathing over the next few days and my expectation is this will totally clear. He will let me know if this is not the case. He will stop antibiotics at this point, he does not need an antibiotic and sulfa is causing significant nausea. Follow-up as previously planned    This visit lasted 15 minutes, greater than 50% of the time was spent counseling on the process involved with inflamed epidermal cysts and follow-up treatment from here on out.

## 2017-12-18 NOTE — PROGRESS NOTES
Chief Complaint   Patient presents with    Groin Swelling     Patient states that his groin cyst has gotten smaller. Patient states that he has been taking his Bactrim but it makes him feel nauseated.     ROOM 9

## 2017-12-18 NOTE — MR AVS SNAPSHOT
Visit Information Date & Time Provider Department Dept. Phone Encounter #  
 12/18/2017 11:00 AM Luca Zepeda MD [facility] [id number] Upcoming Health Maintenance Date Due  
 MEDICARE YEARLY EXAM 10/31/2011 GLAUCOMA SCREENING Q2Y 6/17/2016 COLONOSCOPY 4/8/2024 DTaP/Tdap/Td series (2 - Td) 4/25/2026 Allergies as of 12/18/2017  Review Complete On: 12/18/2017 By: Luca Zepeda MD  
  
 Severity Noted Reaction Type Reactions Sulfa (Sulfonamide Antibiotics) Medium 12/18/2017   Side Effect Nausea Only Current Immunizations  Reviewed on 9/27/2016 Name Date H1N1 FLU VACCINE 1/6/2010 Influenza High Dose Vaccine PF 10/11/2017  8:25 AM, 9/27/2016  4:46 PM  
 Influenza Vaccine PF 10/4/2013 Influenza Vaccine Split 11/26/2012 Influenza Vaccine Whole 9/23/2011 Pneumococcal Conjugate (PCV-13) 9/27/2016  5:07 PM  
 Tdap 4/25/2016 12:56 PM  
 ZZZ-RETIRED (DO NOT USE) Pneumococcal Vaccine (Unspecified Type) 11/26/2012 Zoster 8/26/2011 Not reviewed this visit You Were Diagnosed With   
  
 Codes Comments Epidermal inclusion cyst    -  Primary ICD-10-CM: L72.0 ICD-9-CM: 706. 2 Vitals BP Pulse Temp Resp Height(growth percentile) Weight(growth percentile) 122/72 (BP 1 Location: Right arm, BP Patient Position: Sitting) 74 98 °F (36.7 °C) (Oral) 18 5' 11\" (1.803 m) 196 lb (88.9 kg) SpO2 BMI Smoking Status 97% 27.34 kg/m2 Never Smoker Vitals History BMI and BSA Data Body Mass Index Body Surface Area  
 27.34 kg/m 2 2.11 m 2 Preferred Pharmacy Pharmacy Name Phone [address] [phone] Your Updated Medication List  
  
   
This list is accurate as of: 12/18/17 11:28 AM.  Always use your most recent med list.  
  
  
  
  
 lisinopril-hydroCHLOROthiazide 20-25 mg per tablet Commonly known as:  PRINZIDE, ZESTORETIC  
TAKE 1 TABLET EVERY DAY  
  
 lovastatin 40 mg tablet Commonly known as:  MEVACOR  
TAKE 1 TABLET EVERY NIGHT  
  
 trimethoprim-sulfamethoxazole 160-800 mg per tablet Commonly known as:  BACTRIM DS Take 1 Tab by mouth two (2) times a day. Introducing [facility]! [facility] introduces [facility] patient portal. Now you can access parts of your medical record, email your doctor's office, and request medication refills online. 1. In your internet browser, go to [email or web address] 2. Click on the First Time User? Click Here link in the Sign In box. You will see the New Member Sign Up page. 3. Enter your [employer]is Access Code exactly as it appears below. You will not need to use this code after youve completed the sign-up process. If you do not sign up before the expiration date, you must request a new code. · [employer] Access Code: [email or web address] Expires: 2/14/2018 12:01 PM 
 
4. Enter the last four digits of your Social Security Number (xxxx) and Date of Birth (mm/dd/yyyy) as indicated and click Submit. You will be taken to the next sign-up page. 5. Create a Aldis ID. This will be your [employer]is login ID and cannot be changed, so think of one that is secure and easy to remember. 6. Create a [employer]is password. You can change your password at any time. 7. Enter your Password Reset Question and Answer. This can be used at a later time if you forget your password. 8. Enter your e-mail address. You will receive e-mail notification when new information is available in [address] 9. Click Sign Up. You can now view and download portions of your medical record. 10. Click the Download Summary menu link to download a portable copy of your medical information. If you have questions, please visit the Frequently Asked Questions section of the Aldis website.  Remember, [employer] is NOT to be used for urgent needs. For medical emergencies, dial 911. Now available from your iPhone and Android! Please provide this summary of care documentation to your next provider. Your primary care clinician is listed as Wendy Mims. Ivanna Hunter. If you have any questions after today's visit, please call [phone].

## 2018-02-21 ENCOUNTER — DOCUMENTATION ONLY (OUTPATIENT)
Dept: NEUROLOGY | Age: 73
End: 2018-02-21

## 2018-03-07 ENCOUNTER — TELEPHONE (OUTPATIENT)
Dept: INTERNAL MEDICINE CLINIC | Age: 73
End: 2018-03-07

## 2018-03-07 NOTE — TELEPHONE ENCOUNTER
Anca Kessler MD   [facility] 9 minutes ago (9:42 AM)                 I would recommend milk of magnesia 4 ounces each day until the bowels are working well, it should only take a couple of days with this much medication. (Routing comment)                 LMTCB to give message above.

## 2018-03-07 NOTE — TELEPHONE ENCOUNTER
Pt calling asking RM to call him in a medication to help him go tot he bathroom. Says he hasn't had a mb in 2 weeks. When he tries to go it is hard and he is in a lot of pain. Feels like it is ripping him apart down there. Says he has tried softeners and suppository but no luck. Says if RM needs to see him it would have to be after 3:00 pm today because he is traveling back into town. Kota zafar [facility].    Says this happens to him every so often.

## 2018-04-20 DIAGNOSIS — E78.5 HYPERLIPIDEMIA LDL GOAL <100: ICD-10-CM

## 2018-04-20 DIAGNOSIS — Z00.00 ROUTINE GENERAL MEDICAL EXAMINATION AT A HEALTH CARE FACILITY: ICD-10-CM

## 2018-04-20 DIAGNOSIS — I10 ESSENTIAL HYPERTENSION WITH GOAL BLOOD PRESSURE LESS THAN 140/90: ICD-10-CM

## 2018-05-17 ENCOUNTER — APPOINTMENT (OUTPATIENT)
Dept: INTERNAL MEDICINE CLINIC | Age: 73
End: 2018-05-17

## 2018-05-17 ENCOUNTER — HOSPITAL ENCOUNTER (OUTPATIENT)
Dept: LAB | Age: 73
Discharge: HOME | End: 2018-05-17

## 2018-05-17 PROCEDURE — 99001 SPECIMEN HANDLING PT-LAB: CPT | Performed by: INTERNAL MEDICINE

## 2018-05-18 LAB
ALBUMIN SERPL-MCNC: 4.5 G/DL (ref 3.5–4.8)
ALBUMIN/GLOB SERPL: 2 {RATIO} (ref 1.2–2.2)
ALP SERPL-CCNC: 60 IU/L (ref 39–117)
ALT SERPL-CCNC: 12 IU/L (ref 0–44)
AST SERPL-CCNC: 19 IU/L (ref 0–40)
BASOPHILS # BLD AUTO: 0 X10E3/UL (ref 0–0.2)
BASOPHILS NFR BLD AUTO: 1 %
BILIRUB SERPL-MCNC: 0.5 MG/DL (ref 0–1.2)
BUN SERPL-MCNC: 27 MG/DL (ref 8–27)
BUN/CREAT SERPL: 19 (ref 10–24)
CALCIUM SERPL-MCNC: 9.2 MG/DL (ref 8.6–10.2)
CHLORIDE SERPL-SCNC: 101 MMOL/L (ref 96–106)
CHOLEST SERPL-MCNC: 210 MG/DL (ref 100–199)
CO2 SERPL-SCNC: 26 MMOL/L (ref 18–29)
CREAT SERPL-MCNC: 1.39 MG/DL (ref 0.76–1.27)
EOSINOPHIL # BLD AUTO: 0.1 X10E3/UL (ref 0–0.4)
EOSINOPHIL NFR BLD AUTO: 2 %
ERYTHROCYTE [DISTWIDTH] IN BLOOD BY AUTOMATED COUNT: 15 % (ref 12.3–15.4)
GFR SERPLBLD CREATININE-BSD FMLA CKD-EPI: 51 ML/MIN/1.73
GFR SERPLBLD CREATININE-BSD FMLA CKD-EPI: 59 ML/MIN/1.73
GLOBULIN SER CALC-MCNC: 2.3 G/DL (ref 1.5–4.5)
GLUCOSE SERPL-MCNC: 92 MG/DL (ref 65–99)
HCT VFR BLD AUTO: 41.9 % (ref 37.5–51)
HDLC SERPL-MCNC: 45 MG/DL
HGB BLD-MCNC: 13.9 G/DL (ref 13–17.7)
IMM GRANULOCYTES # BLD: 0 X10E3/UL (ref 0–0.1)
IMM GRANULOCYTES NFR BLD: 0 %
INTERPRETATION, 910389: NORMAL
INTERPRETATION: NORMAL
LDLC SERPL CALC-MCNC: 109 MG/DL (ref 0–99)
LYMPHOCYTES # BLD AUTO: 1.5 X10E3/UL (ref 0.7–3.1)
LYMPHOCYTES NFR BLD AUTO: 28 %
MCH RBC QN AUTO: 28.9 PG (ref 26.6–33)
MCHC RBC AUTO-ENTMCNC: 33.2 G/DL (ref 31.5–35.7)
MCV RBC AUTO: 87 FL (ref 79–97)
MONOCYTES # BLD AUTO: 0.5 X10E3/UL (ref 0.1–0.9)
MONOCYTES NFR BLD AUTO: 9 %
NEUTROPHILS # BLD AUTO: 3.2 X10E3/UL (ref 1.4–7)
NEUTROPHILS NFR BLD AUTO: 60 %
PDF IMAGE, 910387: NORMAL
PLATELET # BLD AUTO: 210 X10E3/UL (ref 150–379)
POTASSIUM SERPL-SCNC: 4.1 MMOL/L (ref 3.5–5.2)
PROT SERPL-MCNC: 6.8 G/DL (ref 6–8.5)
RBC # BLD AUTO: 4.81 X10E6/UL (ref 4.14–5.8)
SODIUM SERPL-SCNC: 142 MMOL/L (ref 134–144)
TRIGL SERPL-MCNC: 278 MG/DL (ref 0–149)
VLDLC SERPL CALC-MCNC: 56 MG/DL (ref 5–40)
WBC # BLD AUTO: 5.3 X10E3/UL (ref 3.4–10.8)

## 2018-05-24 ENCOUNTER — TELEPHONE (OUTPATIENT)
Dept: INTERNAL MEDICINE CLINIC | Age: 73
End: 2018-05-24

## 2018-05-24 NOTE — TELEPHONE ENCOUNTER
Patient wants to know if someone comes to see RM and they are forgetting things does RM have to inform some type of agency because the patient is forgetting things. Like if a patient has dementia does that have to be reported somewhere?     pls advise

## 2018-06-20 RX ORDER — IBUPROFEN 800 MG/1
800 TABLET ORAL
Qty: 360 TAB | Refills: 1 | Status: SHIPPED | OUTPATIENT
Start: 2018-06-20

## 2018-06-20 NOTE — TELEPHONE ENCOUNTER
Last Visit: 12/18/2017 with MD Russ Mixon    Next Appointment: 07/23/2018 with MD Russ Mixon   Previous Refill Encounters: 04/28/2017 per MD [place] Single #[phone] with 2 refills     Requested Prescriptions     Pending Prescriptions Disp Refills    ibuprofen (MOTRIN) 800 mg tablet 360 Tab 1     Sig: Take 1 Tab by mouth every six (6) hours as needed.

## 2018-06-20 NOTE — TELEPHONE ENCOUNTER
ibuprofen 800 mg Q 90    Takes 1 every 6 hours as needed    Pt says he only takes if he has sore muscles. Doesn't take daily. Miguel Feldman Mail order

## 2018-07-19 ENCOUNTER — TELEPHONE (OUTPATIENT)
Dept: INTERNAL MEDICINE CLINIC | Age: 73
End: 2018-07-19

## 2021-07-11 ENCOUNTER — HOSPITAL ENCOUNTER (EMERGENCY)
Age: 76
Discharge: HOME | End: 2021-07-12
Attending: STUDENT IN AN ORGANIZED HEALTH CARE EDUCATION/TRAINING PROGRAM
Payer: MEDICARE

## 2021-07-11 DIAGNOSIS — R06.6 INTRACTABLE HICCUPS: Primary | ICD-10-CM

## 2021-07-11 PROCEDURE — 74011250636 HC RX REV CODE- 250/636: Performed by: PHYSICIAN ASSISTANT

## 2021-07-11 PROCEDURE — 96372 THER/PROPH/DIAG INJ SC/IM: CPT

## 2021-07-11 PROCEDURE — 93005 ELECTROCARDIOGRAM TRACING: CPT

## 2021-07-11 PROCEDURE — 74011250637 HC RX REV CODE- 250/637: Performed by: PHYSICIAN ASSISTANT

## 2021-07-11 RX ORDER — LORAZEPAM 2 MG/ML
1 INJECTION INTRAMUSCULAR
Status: COMPLETED | OUTPATIENT
Start: 2021-07-11 | End: 2021-07-12

## 2021-07-11 RX ORDER — CHLORPROMAZINE HYDROCHLORIDE 25 MG/ML
50 INJECTION INTRAMUSCULAR
Status: COMPLETED | OUTPATIENT
Start: 2021-07-11 | End: 2021-07-11

## 2021-07-11 RX ORDER — METOCLOPRAMIDE HYDROCHLORIDE 5 MG/ML
10 INJECTION INTRAMUSCULAR; INTRAVENOUS
Status: COMPLETED | OUTPATIENT
Start: 2021-07-11 | End: 2021-07-11

## 2021-07-11 RX ORDER — METOCLOPRAMIDE HYDROCHLORIDE 5 MG/ML
5 INJECTION INTRAMUSCULAR; INTRAVENOUS
Status: DISCONTINUED | OUTPATIENT
Start: 2021-07-11 | End: 2021-07-11

## 2021-07-11 RX ORDER — DIPHENHYDRAMINE HYDROCHLORIDE 50 MG/ML
25 INJECTION, SOLUTION INTRAMUSCULAR; INTRAVENOUS
Status: COMPLETED | OUTPATIENT
Start: 2021-07-11 | End: 2021-07-11

## 2021-07-11 RX ORDER — BACLOFEN 10 MG/1
5 TABLET ORAL
Status: COMPLETED | OUTPATIENT
Start: 2021-07-11 | End: 2021-07-11

## 2021-07-11 RX ADMIN — METOCLOPRAMIDE HYDROCHLORIDE 10 MG: 5 INJECTION INTRAMUSCULAR; INTRAVENOUS at 19:57

## 2021-07-11 RX ADMIN — CHLORPROMAZINE HYDROCHLORIDE 50 MG: 25 INJECTION INTRAMUSCULAR at 22:54

## 2021-07-11 RX ADMIN — DIPHENHYDRAMINE HYDROCHLORIDE 25 MG: 50 INJECTION INTRAMUSCULAR; INTRAVENOUS at 22:54

## 2021-07-11 RX ADMIN — BACLOFEN 5 MG: 10 TABLET ORAL at 21:00

## 2021-07-11 NOTE — ED TRIAGE NOTES
Patient A/O x 4, presented to the ED with complaint of hiccups x 1 hour. Patient states he's preparing for a colostomy and has been drinking a lot of water today and magnesium citrate and began to have hiccups. Patient denies chest pain, N/V, abdominal pain, SOB.

## 2021-07-11 NOTE — ED PROVIDER NOTES
EMERGENCY DEPARTMENT HISTORY AND PHYSICAL EXAM      Date: (Not on file)  Patient Name: Raina Sullivan    History of Presenting Illness     Chief Complaint   Patient presents with    Hiccups       History Provided By: Patient    HPI: Raina Sullivan, 77 y.o. male PMHx significant for any stone, hyperlipidemia, asbestos exposure, hypertension, GERD, sleep apnea presents ambulatory to the ED. patient reports he has been preparing for colonoscopy today and drinking a lot of water. Patient ports hiccups began about 1 hour prior to arrival.  Patient reports he has tried holding his breath and laying on his back without relief of symptoms. Pt reports recurrent hx hiccups. Pt has not taken anything for sx. There are no other complaints, changes, or physical findings at this time. PCP: Petey Lopez MD    No current facility-administered medications on file prior to encounter. Current Outpatient Medications on File Prior to Encounter   Medication Sig Dispense Refill    ibuprofen (MOTRIN) 800 mg tablet Take 1 Tab by mouth every six (6) hours as needed.  360 Tab 1    lisinopril-hydroCHLOROthiazide (PRINZIDE, ZESTORETIC) 20-25 mg per tablet TAKE 1 TABLET EVERY DAY 90 Tab 3    lovastatin (MEVACOR) 40 mg tablet TAKE 1 TABLET EVERY NIGHT 90 Tab 3       Past History     Past Medical History:  Past Medical History:   Diagnosis Date    Asbestos exposure     FHx: hypothyroidism     GERD (gastroesophageal reflux disease)     Hyperlipidemia     Hypertension     Idiopathic hypersomnia     Nephrolithiasis     Unspecified sleep apnea     no C-Pap       Past Surgical History:  Past Surgical History:   Procedure Laterality Date    HX COLONOSCOPY  4/8/14    10 yrs    HX ORTHOPAEDIC Right 10/27/2016    rotator cuff repair    HX TONSILLECTOMY      HX UROLOGICAL  12/14/2016    Stent placement       Family History:  Family History   Problem Relation Age of Onset    Heart Attack Father     Heart Disease Father  Hypertension Mother     Other Brother         hypothyroid    Other Sister         hypothyroid    Seizures Son        Social History:  Social History     Tobacco Use    Smoking status: Never Smoker    Smokeless tobacco: Never Used   Substance Use Topics    Alcohol use: No    Drug use: No       Allergies: Allergies   Allergen Reactions    Sulfa (Sulfonamide Antibiotics) Nausea Only         Review of Systems   Review of Systems   Constitutional: Negative for chills and fever. HENT: Negative for facial swelling. Eyes: Negative for photophobia and visual disturbance. Respiratory: Negative for shortness of breath. Cardiovascular: Negative for chest pain. Gastrointestinal: Negative for abdominal pain, nausea and vomiting. Hiccups   Genitourinary: Negative for flank pain. Skin: Negative for color change, pallor, rash and wound. Neurological: Negative for dizziness, weakness, light-headedness and headaches. All other systems reviewed and are negative. Physical Exam   Physical Exam  Vitals and nursing note reviewed. Constitutional:       General: He is not in acute distress. Appearance: He is well-developed. Comments: Pt in NAD   HENT:      Head: Normocephalic and atraumatic. Eyes:      Conjunctiva/sclera: Conjunctivae normal.   Cardiovascular:      Rate and Rhythm: Normal rate and regular rhythm. Heart sounds: Normal heart sounds. Pulmonary:      Effort: Pulmonary effort is normal. No respiratory distress. Breath sounds: Normal breath sounds. Abdominal:      General: Bowel sounds are normal.      Palpations: Abdomen is soft. Tenderness: There is no abdominal tenderness. Comments: Hiccups  Abdomen soft, notender  Nondistended  No guarding or rigidity    Musculoskeletal:         General: Normal range of motion. Skin:     General: Skin is warm. Findings: No rash.    Neurological:      Mental Status: He is alert and oriented to person, place, and time. Cranial Nerves: No cranial nerve deficit. Psychiatric:         Behavior: Behavior normal.         Diagnostic Study Results     Labs -   No results found for this or any previous visit (from the past 12 hour(s)). Radiologic Studies -   No orders to display     CT Results  (Last 48 hours)    None        CXR Results  (Last 48 hours)    None          Medical Decision Making   I am the first provider for this patient. I reviewed the vital signs, available nursing notes, past medical history, past surgical history, family history and social history. Vital Signs-Reviewed the patient's vital signs. Patient Vitals for the past 12 hrs:   Temp Pulse Resp BP SpO2   07/11/21 1932 98.4 °F (36.9 °C) 63 18 127/87 99 %       Records Reviewed: Nursing Notes and Old Medical Records    Provider Notes (Medical Decision Making):   DDx: Hiccups, ACS    78 yo M who presents with hiccups x 1 hour. Preparing for colonoscopy tomorrow with increased water intake. On exam, multiple hiccups per minute. ED Course:   Initial assessment performed. The patients presenting problems have been discussed, and they are in agreement with the care plan formulated and outlined with them. I have encouraged them to ask questions as they arise throughout their visit. 1957: Pt received Reglan. 2052: Pt reassessed. Hiccups have not improved. 2230: Pt reassessed and hiccups continued. Discussed with attending, Dr Ambar Dixon, who recommended thorazine. Spoke with pharmacy who recommended giving thorazine IM with benadryl due to recent reglan given. Dr Ambar Dixon in agreement. Placed in bed on monitor. Thorazine and benadryl ordered. 2350: Pt appears mildly agitated, itching nose and moving in bed. Discussed with Dr Ambar Dixon, who recommended ativan. 0115: Hiccups have now subsided. Pt mildly agitated in bed with stable vital signs. Will continue to monitor. 0200: Hiccups have still subsided. Mildly agitated. Stable vital signs. Transfer of care to Dr Lyle Glez, attending at shift change. Plan: Continue to assess until medication wears off. Attestations:    WAI Ward    Please note that this dictation was completed with [employer], the computer voice recognition software. Quite often unanticipated grammatical, syntax, homophones, and other interpretive errors are inadvertently transcribed by the computer software. Please disregard these errors. Please excuse any errors that have escaped final proofreading. Thank you. Assumed care of patient 7:25 AM from Dr. Lyle Glez with plan to dispo based on results of improved ams from medications    If resolved to baseline, then d/c home      ED Course as of Jul 12 1704   Mon Jul 12, 2021   0914 9:14 AM  Still somnolent, will reassess in a few hours. [DM]   2403 QRCFZ somnolent. Will continually reassess. [DM]   5660 Awakes to physical stimuli, moves all extremities. [DM]   4740 For patient information, half life of thorazine, 30 hours. [DM]      ED Course User Index  [DM] Kristin Bravo MD           Visit Vitals  /67   Pulse 85   Temp 98.3 °F (36.8 °C)   Resp 20   SpO2 98%     Patient evaluated, localizes to pain, intermittent snoring respirations, on oxygen, on monitor. Pupils 2 mm, reactive. Assessed periodically throughout the shift with some improvement, no change of patient condition in a negative aspect. He appears to be metabolizing medications. 5:06 PM  Tavon Mccullough, daughter, calling for patients wife, conversation with her re patient care timeline, medications. Would like to be called at [phone] with updates    5:08 PM  Patient's presentation, labs/imaging and plan of care was reviewed with Dr. Pepe Peraza as part of sign out. They will follow up with CT head as part of the plan discussed with the patient.     Dr. Rolly Hope assistance in completion of this plan is greatly appreciated but it should be noted that I will be the provider of record for this patient.

## 2021-07-12 ENCOUNTER — APPOINTMENT (OUTPATIENT)
Dept: CT IMAGING | Age: 76
End: 2021-07-12
Attending: STUDENT IN AN ORGANIZED HEALTH CARE EDUCATION/TRAINING PROGRAM
Payer: MEDICARE

## 2021-07-12 VITALS
OXYGEN SATURATION: 98 % | TEMPERATURE: 98.3 F | DIASTOLIC BLOOD PRESSURE: 130 MMHG | HEART RATE: 95 BPM | SYSTOLIC BLOOD PRESSURE: 143 MMHG | RESPIRATION RATE: 14 BRPM

## 2021-07-12 LAB
ATRIAL RATE: 49 BPM
ATRIAL RATE: 54 BPM
CALCULATED P AXIS, ECG09: 28 DEGREES
CALCULATED R AXIS, ECG10: 44 DEGREES
CALCULATED R AXIS, ECG10: 75 DEGREES
CALCULATED T AXIS, ECG11: 13 DEGREES
CALCULATED T AXIS, ECG11: 74 DEGREES
DIAGNOSIS, 93000: NORMAL
DIAGNOSIS, 93000: NORMAL
GLUCOSE BLD STRIP.AUTO-MCNC: 105 MG/DL (ref 70–110)
P-R INTERVAL, ECG05: 196 MS
Q-T INTERVAL, ECG07: 482 MS
Q-T INTERVAL, ECG07: 516 MS
QRS DURATION, ECG06: 86 MS
QRS DURATION, ECG06: 88 MS
QTC CALCULATION (BEZET), ECG08: 457 MS
QTC CALCULATION (BEZET), ECG08: 466 MS
VENTRICULAR RATE, ECG03: 49 BPM
VENTRICULAR RATE, ECG03: 54 BPM

## 2021-07-12 PROCEDURE — 82962 GLUCOSE BLOOD TEST: CPT

## 2021-07-12 PROCEDURE — 74011250636 HC RX REV CODE- 250/636: Performed by: PHYSICIAN ASSISTANT

## 2021-07-12 PROCEDURE — 70450 CT HEAD/BRAIN W/O DYE: CPT

## 2021-07-12 PROCEDURE — 96374 THER/PROPH/DIAG INJ IV PUSH: CPT

## 2021-07-12 PROCEDURE — 99285 EMERGENCY DEPT VISIT HI MDM: CPT

## 2021-07-12 RX ADMIN — LORAZEPAM 1 MG: 2 INJECTION INTRAMUSCULAR; INTRAVENOUS at 00:03

## 2021-07-12 NOTE — ED NOTES
Repositioned on stretcher to comfort. Patient is responsive to verbal commands, but is still drowsy. Wife remains at bedside. Explanation of wait provided to the patient and his wife. Will continue to monitor.

## 2021-07-12 NOTE — ED NOTES
Daughter Bobby Goldstein called to check in on her father, Mr. Herrera Abdi gave permission to update daughter. Bedside nurse informed her that we are monitoring him as of now, she is welcomed to call for more updates throughout his stay.

## 2021-07-12 NOTE — ED NOTES
Assisted patient with urinal.  Patient voided approximately 300 ml of clear, yellow urine. Patient cleaned, new brief applied, then repositioned to comfort. Wife remains at bedside. Will continue to monitor.

## 2021-07-12 NOTE — DISCHARGE INSTRUCTIONS
Please return to the ER with any new or worsening symptoms or any other concerns. Please return to the Emergency Department if you develop a fever, chills, cannot eat or drink due to nausea or vomiting, or if any of your symptoms worsen. Please follow up with PCP for hiccups    Also, It is extremely important that you follow-up with a primary care physician and if you do not have one currently use the contact information provided to obtain an appointment. If none was provided please call the number on the back of your insurance card to locate a Primary care doctor. Many offices have \"cancellation lists\" that you can ask to be placed on; should a patient with an earlier appointment cancel you will be notified to take their place. Please return to the Emergency Room immediately if your symptoms worsen. Please carefully read all discharge instructions    [email or web address]    What are GoodRx coupons? GoodRx coupons will help you pay less than the cash price for your prescription. Nuvia Kuster free to use and are accepted at virtually every [place] pharmacy. Your pharmacist will know how to enter the codes on the coupon to pull up the lowest discount available.

## 2021-07-12 NOTE — ED NOTES
Elana Bashir mentioned that he could use some sugar. Brayan De La Torre wanted his sugars checked just in case.

## 2021-07-12 NOTE — ED NOTES
Shift report received from [place]. Assumed care of patient. Received patient resting on stretcher, with eyes closed, snoring. Patient responsive to painful stimuli. Wife at bedside. Patient is cleared for discharge when he is more alert. Explanation of plan of care provided tot he patient.

## 2021-07-12 NOTE — ED NOTES
Bedside report received from Yuri Sampson [address] [place] [address]. Pt resting in bed with eyes closed. No acute distress noted. Symmetrical rise and fall of chest. Call light in reach.

## 2021-07-12 NOTE — ED NOTES
The patient remains drowsy, responsive to painful stimuli. Dr. Sheryle Gala informed of patient's status. He had already reassessed the patient. He recommends continuing to monitor the patient. Patient's wife at bedside. Explanation of plan of care provided to her. Will continue to monitor patient's status.

## 2021-07-12 NOTE — ED NOTES
Patient turned over to me Dr. Paz Later 78-year-old gentleman pending increased alertness. I did see and evaluate him around 6:00 PM.  He is now awake and alert without complaint. He did receive multiple doses of sedatives earlier in the day and was felt not to be alert enough to go home.   He is now awake and alert will likely discharge shortly    Is head CT is still pending so will need results of that prior to discharge

## 2021-07-13 NOTE — ED NOTES
Attempted to awake pt. Pt was able to get up and stand. Pt was changed but fell back to sleep 5 mins later.

## 2021-07-13 NOTE — ED NOTES
Patient is discharged pending being able to wake up and walk. Pt currently still very drowsy and lethargic from previous medications given, see MAR. Will continue to monitor patient and trial waking up.

## 2021-07-13 NOTE — PROGRESS NOTES
conducted an initial consultation and Spiritual Assessment for [employer], who is a 77 y.o.,male. Patient's Primary Language is: [place]. According to the patient's EMR Jehovah's witness Affiliation is: Maximilian of god. The reason the Patient came to the hospital is:   Patient Active Problem List    Diagnosis Date Noted    Allergic rhinitis 09/28/2011    Epidermal inclusion cyst 12/18/2017    Gastroesophageal reflux disease without esophagitis 11/16/2017    Chronic constipation 11/16/2017    Primary osteoarthritis involving multiple joints 11/16/2017    Acute pain of right shoulder 09/27/2016    Essential hypertension with goal blood pressure less than 140/90 04/25/2016    Hyperlipidemia LDL goal <100 04/25/2016    Obstructive sleep apnea 01/08/2015    Sleep apnea, obstructive 05/09/2014    Vertigo 01/17/2014    Nephrolithiasis     Asbestos exposure     Idiopathic hypersomnia     FHx: hypothyroidism         The  provided the following Interventions:  Initiated a relationship of care and support. Offered prayer and assurance of continued prayers on patient's behalf. Chart reviewed. The following outcomes where achieved:  Patient spouse expressed gratitude for 's visit. Assessment:  There are no spiritual or Druze issues which require intervention at this time. Plan:  Chaplains will continue to follow and will provide pastoral care on an as needed/requested basis.     [address]   [phone]

## 2021-10-25 ENCOUNTER — TRANSCRIBE ORDER (OUTPATIENT)
Dept: SCHEDULING | Age: 76
End: 2021-10-25

## 2021-10-25 DIAGNOSIS — G40.019 BENIGN ROLANDIC EPILEPSY, INTRACTABLE (HCC): Primary | ICD-10-CM

## 2021-10-25 DIAGNOSIS — I63.033 CEREBRAL INFARCTION DUE TO BILATERAL THROMBOSIS OF CAROTID ARTERIES (HCC): Primary | ICD-10-CM

## 2021-11-15 ENCOUNTER — HOSPITAL ENCOUNTER (OUTPATIENT)
Dept: MRI IMAGING | Age: 76
Discharge: HOME | End: 2021-11-15
Payer: MEDICARE

## 2021-11-15 ENCOUNTER — HOSPITAL ENCOUNTER (OUTPATIENT)
Dept: NEUROLOGY | Age: 76
Discharge: HOME | End: 2021-11-15
Payer: MEDICARE

## 2021-11-15 DIAGNOSIS — G40.019 BENIGN ROLANDIC EPILEPSY, INTRACTABLE (HCC): ICD-10-CM

## 2021-11-15 DIAGNOSIS — I63.033 CEREBRAL INFARCTION DUE TO BILATERAL THROMBOSIS OF CAROTID ARTERIES (HCC): ICD-10-CM

## 2021-11-15 PROCEDURE — 95819 EEG AWAKE AND ASLEEP: CPT | Performed by: STUDENT IN AN ORGANIZED HEALTH CARE EDUCATION/TRAINING PROGRAM

## 2021-11-15 PROCEDURE — 95819 EEG AWAKE AND ASLEEP: CPT

## 2021-11-15 PROCEDURE — 70551 MRI BRAIN STEM W/O DYE: CPT

## 2021-11-20 NOTE — PROCEDURES
[address] [place] [address]   EEG    Name:  Bola Burgess  MR#:   [id number]  :  1945  ACCOUNT #:  [de-identified]  DATE OF SERVICE:  11/15/2021    OUTPATIENT RECORDING    EEG NUMBER:  MV EEG     REFERRING PROVIDER:  EEG was ordered by Dr. Crystal Silva. REASON FOR EEG:  For evaluation of memory loss. EEG TECHNIQUE USED:  Standard 10-20 system with 16-channel recording and an EKG. Activation procedure used was photic stimulation. Total duration of the recording was 32 minutes. This is an awake and sleep EEG recording. EEG REPORT:  The background consists of posterior dominant alpha rhythms at a frequency of 10 Hz and they attenuate to eye opening. No significant asymmetry between the right and left sides. There are no obvious spikes or sharp wave discharges. No focal slowing. Photic stimulation did not induce any abnormal discharges. No abnormal discharges seen during the sleep. IMPRESSION:  This is basically a normal EEG recording. CLINICAL CORRELATION:  Normal EEG does not rule out the possibility of seizures or epilepsy.       MD GUMARO [id number]  D:  2021 14:35  T:  2021 23:45  2021 23:45  JOB #:  [id number]

## 2022-03-19 PROBLEM — K21.9 GASTROESOPHAGEAL REFLUX DISEASE WITHOUT ESOPHAGITIS: Status: ACTIVE | Noted: 2017-11-16

## 2022-03-19 PROBLEM — L72.0 EPIDERMAL INCLUSION CYST: Status: ACTIVE | Noted: 2017-12-18

## 2022-03-19 PROBLEM — K59.09 CHRONIC CONSTIPATION: Status: ACTIVE | Noted: 2017-11-16

## 2022-03-20 PROBLEM — M15.9 PRIMARY OSTEOARTHRITIS INVOLVING MULTIPLE JOINTS: Status: ACTIVE | Noted: 2017-11-16

## 2022-05-11 ENCOUNTER — OFFICE VISIT (OUTPATIENT)
Dept: ORTHOPEDIC SURGERY | Age: 77
End: 2022-05-11
Payer: MEDICARE

## 2022-05-11 VITALS
OXYGEN SATURATION: 99 % | HEART RATE: 56 BPM | WEIGHT: 168 LBS | DIASTOLIC BLOOD PRESSURE: 81 MMHG | HEIGHT: 71 IN | BODY MASS INDEX: 23.52 KG/M2 | RESPIRATION RATE: 16 BRPM | SYSTOLIC BLOOD PRESSURE: 165 MMHG | TEMPERATURE: 95.8 F

## 2022-05-11 DIAGNOSIS — R20.0 NUMBNESS AND TINGLING OF LEFT UPPER EXTREMITY: Primary | ICD-10-CM

## 2022-05-11 DIAGNOSIS — R20.2 NUMBNESS AND TINGLING OF LEFT UPPER EXTREMITY: Primary | ICD-10-CM

## 2022-05-11 DIAGNOSIS — R20.2 NUMBNESS AND TINGLING OF LEFT UPPER EXTREMITY: ICD-10-CM

## 2022-05-11 DIAGNOSIS — R20.0 NUMBNESS AND TINGLING OF LEFT UPPER EXTREMITY: ICD-10-CM

## 2022-05-11 DIAGNOSIS — R94.131 ABNORMAL EMG: ICD-10-CM

## 2022-05-11 PROCEDURE — 95886 MUSC TEST DONE W/N TEST COMP: CPT | Performed by: PHYSICAL MEDICINE & REHABILITATION

## 2022-05-11 PROCEDURE — 95909 NRV CNDJ TST 5-6 STUDIES: CPT | Performed by: PHYSICAL MEDICINE & REHABILITATION

## 2022-05-11 NOTE — PROGRESS NOTES
Chief Complaint   Patient presents with    Arm Pain     EMG-LUE       Pt preferred language for health care discussion is english. Is someone accompanying this pt? no    Is the patient using any DME equipment during OV? no    Depression Screening:  3 most recent [facility] Screens 11/16/2017 4/28/2017 11/7/2016 9/27/2016 9/14/2016 8/3/2016 4/25/2016   Little interest or pleasure in doing things Not at all Not at all Not at all Not at all Not at all Not at all Not at all   Feeling down, depressed, irritable, or hopeless Not at all Not at all Not at all Not at all Not at all Not at all Not at all   Total Score PHQ 2 0 0 0 0 0 0 0       Learning Assessment:  Learning Assessment 2/12/2014 1/22/2013   PRIMARY LEARNER Patient Patient   HIGHEST LEVEL OF EDUCATION - PRIMARY LEARNER  SOME COLLEGE -   BARRIERS PRIMARY LEARNER NONE -   CO-LEARNER CAREGIVER No -   PRIMARY LANGUAGE ENGLISH ENGLISH   LEARNER PREFERENCE PRIMARY DEMONSTRATION READING   ANSWERED BY patient Patient   RELATIONSHIP SELF SELF       Abuse Screening:  Abuse Screening Questionnaire 2/12/2014   Do you ever feel afraid of your partner? N   Are you in a relationship with someone who physically or mentally threatens you? N   Is it safe for you to go home? Y       Fall Risk  Fall Risk Assessment, last 12 mths 11/16/2017   Able to walk? Yes   Fall in past 12 months? No           Advance Directive:  1. Do you have an advance directive in place? Patient Reply:no    2. If not, would you like material regarding how to put one in place? Patient Reply: no    2. Per patient no changes to their ACP contact no. Coordination of Care:  1. Have you been to the ER, urgent care clinic since your last visit? Hospitalized since your last visit? no    2. Have you seen or consulted any other health care providers outside of the [address] [place] [address] since your last visit? Include any pap smears or colon screening.  no

## 2022-05-11 NOTE — PROGRESS NOTES
[address]  [address]  [address]  Phone: [phone]  Fax: [phone]        Parisa Alejandro  : 1945  PCP: Ira Chang MD  2022    ELECTROMYOGRAPHY AND NERVE CONDUCTION STUDIES    Yaw Pastor was referred by Dr. Hamilton Quintero for electrodiagnostic evaluation of LUE paraesthesia. NCV & EMG Findings:  Evaluation of the left median motor nerve showed decreased conduction velocity (Elbow-Wrist, 16 m/s). The left ulnar motor nerve showed decreased conduction velocity (B Elbow-Wrist, 23 m/s). The left median sensory nerve showed prolonged distal peak latency (4.2 ms) and decreased conduction velocity (Wrist-2nd Digit, 33 m/s). All remaining nerves (as indicated in the following tables) were within normal limits. Needle evaluation of the left triceps, the left abductor pollicis brevis, and the left first dorsal interosseous muscles showed increased motor unit amplitude. All remaining muscles (as indicated in the following table) showed no evidence of electrical instability. INTERPRETATION    This is an abnormal electrodiagnostic examination. These findings may be consistent with:   1. Mild median mononeuropathy at the left wrist (carpal tunnel syndrome)   2. Chronic left C8 radiculopathy - there is no current sign of active denervation. This is based on findings of increased amplitude consistent with remote chronicity. There is no electrodiagnostic evidence of any brachial plexopathy, peripheral polyneuropathy, or any other mononeuropathy. CLINICAL INTERPRETATION    His electrodiagnostic findings of left C8 radiculopathy and median mononeuropathy at the wrist may be consistent with his left arm symptoms. HISTORY OF PRESENT ILLNESS  Yaw Pastor is a 77 y.o. male. Pt presents today for LUE EMG evaluation of LUE paraesthesia, mostly tingling, into all digits.  His symptoms are worse when he is at [employer] or when he rests his elbow on his desk. PAST MEDICAL HISTORY   Past Medical History:   Diagnosis Date    Asbestos exposure     FHx: hypothyroidism     GERD (gastroesophageal reflux disease)     Hyperlipidemia     Hypertension     Idiopathic hypersomnia     Nephrolithiasis     Unspecified sleep apnea     no C-Pap       Past Surgical History:   Procedure Laterality Date    HX COLONOSCOPY  4/8/14    10 yrs    HX ORTHOPAEDIC Right 10/27/2016    rotator cuff repair    HX TONSILLECTOMY      HX UROLOGICAL  12/14/2016    Stent placement   . MEDICATIONS      Current Outpatient Medications   Medication Sig Dispense Refill    ibuprofen (MOTRIN) 800 mg tablet Take 1 Tab by mouth every six (6) hours as needed.  360 Tab 1    lisinopril-hydroCHLOROthiazide (PRINZIDE, ZESTORETIC) 20-25 mg per tablet TAKE 1 TABLET EVERY DAY 90 Tab 3    lovastatin (MEVACOR) 40 mg tablet TAKE 1 TABLET EVERY NIGHT 90 Tab 3        ALLERGIES    Allergies   Allergen Reactions    Sulfa (Sulfonamide Antibiotics) Nausea Only          SOCIAL HISTORY    Social History     Socioeconomic History    Marital status:    Tobacco Use    Smoking status: Never Smoker    Smokeless tobacco: Never Used   Substance and Sexual Activity    Alcohol use: No    Drug use: No    Sexual activity: Yes     Partners: Female       FAMILY HISTORY  Family History   Problem Relation Age of Onset    Heart Attack Father     Heart Disease Father     Hypertension Mother     Other Brother         hypothyroid    Other Sister         hypothyroid    Seizures Son          PHYSICAL EXAMINATION  Visit Vitals  BP (!) 165/81   Pulse (!) 56   Temp (!) 95.8 °F (35.4 °C) (Tympanic)   Resp 16   Ht 5' 11\" (1.803 m)   Wt 168 lb (76.2 kg)   SpO2 99%   BMI 23.43 kg/m²       Pain Assessment  5/11/2022   Location of Pain (No Data)   Pain Location Comment no pain just numbness   Location Modifiers -   Severity of Pain -   Quality of Pain -   Duration of Pain - Frequency of Pain -   Aggravating Factors -   Limiting Behavior -   Relieving Factors -   Result of Injury -           Constitutional:  Well developed, well nourished, in no acute distress. Psychiatric: Affect and mood are appropriate. Integumentary: No rashes or abrasions noted on exposed areas. SPINE/MUSCULOSKELETAL EXAM    On brief examination: Intact strength and sensation in BUE. Positive Spurling sign on the left.       NCV & EMG Findings:  Nerve Conduction Studies  Anti Sensory Summary Table     Stim Site NR Peak (ms) Norm Peak (ms) O-P Amp (µV) Norm O-P Amp Site1 Site2 Delta-P (ms) Dist (cm) Almas (m/s) Norm Almas (m/s)   Left Median Anti Sensory (2nd Digit)   Wrist    4.2 <4 17.9 >13 Wrist 2nd Digit 4.2 14.0 33 >39   Left Radial Anti Sensory (Base 1st Digit)   Wrist    2.2 2.8 17.7 11 Wrist Base 1st Digit 2.2 10.0 45    Left Ulnar Anti Sensory (5th Digit)   Wrist    3.6 <4.0 15.6 >9 Wrist 5th Digit 3.6 14.0 39 >38     Motor Summary Table     Stim Site NR Onset (ms) Norm Onset (ms) O-P Amp (mV) Norm O-P Amp Site1 Site2 Delta-0 (ms) Dist (cm) Almas (m/s) Norm Almas (m/s)   Left Median Motor (Abd Poll Brev)   Wrist    4.3 <4.5 6.0 >4.1 Elbow Wrist 5.0 8.0 16 >49   Elbow    9.3  5.3          Left Ulnar Motor (Abd Dig Min)   Wrist    3.4 <3.7 7.3 >5.0 B Elbow Wrist 3.5 8.0 23 >52   B Elbow    6.9  6.6  A Elbow B Elbow 1.7 10.0 59 >43   A Elbow    8.6  6.6            EMG     Side Muscle Nerve Root Ins Act Fibs Psw Amp Dur Poly Recrt Int Kisha Fan Comment   Left Biceps Musculocut C5-6 Nml Nml Nml Nml Nml 0 Nml Nml    Left Triceps Radial C6-7-8 Nml Nml Nml Incr Nml 0 Nml Nml    Left PronatorTeres Median C6-7 Nml Nml Nml Nml Nml 0 Nml Nml    Left Ext Digitorum Radial (Post Int) C7-8 Nml Nml Nml Nml Nml 0 Nml Nml    Left Abd Poll Brev Median C8-T1 Nml Nml Nml Incr Nml 0 Nml Nml    Left 1stDorInt Ulnar C8-T1 Nml Nml Nml Incr Nml 0 Nml Nml    Left Cervical Parasp Up Rami C1-3 Nml Nml Nml         Left Cervical Parasp Mid Rami C4-6 Nml Nml Nml         Left Cervical Parasp Low Rami C7-8 Nml Nml Nml             Nerve Conduction Studies  Anti Sensory Left/Right Comparison     Stim Site L Lat (ms) R Lat (ms) L-R Lat (ms) L Amp (µV) R Amp (µV) L-R Amp (%) Site1 Site2 L Almas (m/s) R Almas (m/s) L-R Almas (m/s)   Median Anti Sensory (2nd Digit)   Wrist 4.2   17.9   Wrist 2nd Digit 33     Radial Anti Sensory (Base 1st Digit)   Wrist 2.2   17.7   Wrist Base 1st Digit 45     Ulnar Anti Sensory (5th Digit)   Wrist 3.6   15.6   Wrist 5th Digit 39       Motor Left/Right Comparison     Stim Site L Lat (ms) R Lat (ms) L-R Lat (ms) L Amp (mV) R Amp (mV) L-R Amp (%) Site1 Site2 L Almas (m/s) R Almas (m/s) L-R Almas (m/s)   Median Motor (Abd Poll Brev)   Wrist 4.3   6.0   Elbow Wrist 16     Elbow 9.3   5.3          Ulnar Motor (Abd Dig Min)   Wrist 3.4   7.3   B Elbow Wrist 23     B Elbow 6.9   6.6   A Elbow B Elbow 59     A Elbow 8.6   6.6                Waveforms:                     [facility]  OFFICE PROCEDURE PROGRESS NOTE        Chart reviewed for the following:   Nik PYLE, have reviewed the History, Physical and updated the Allergic reactions for Orion Bean     TIME OUT performed immediately prior to start of procedure:   Nik PYLE, have performed the following reviews on [address] [place] [address] prior to the start of the procedure:            * Patient was identified by name and date of birth   * Agreement on procedure being performed was verified  * Risks and Benefits explained to the patient  * Procedure site verified and marked as necessary  * Patient was positioned for comfort  * Consent was signed and verified     Time: 1:52 PM    Date of procedure: 5/11/2022    Procedure performed by:  Leydi Mckeon MD    Provider accompanied by: Glendy. Patient accompanied by: Self.     How tolerated by patient: tolerated the procedure well with no complications    Post Procedural Pain Scale: 0 - No [place]    Comments: none    Written by Ramon Acosta as dictated by Ayan Schaefer MD

## 2022-08-05 ENCOUNTER — TRANSCRIBE ORDER (OUTPATIENT)
Dept: SCHEDULING | Age: 77
End: 2022-08-05

## 2022-08-05 DIAGNOSIS — R42 DIZZINESS: Primary | ICD-10-CM

## 2022-08-12 ENCOUNTER — HOSPITAL ENCOUNTER (OUTPATIENT)
Dept: VASCULAR SURGERY | Age: 77
Discharge: HOME | End: 2022-08-12
Attending: STUDENT IN AN ORGANIZED HEALTH CARE EDUCATION/TRAINING PROGRAM
Payer: MEDICARE

## 2022-08-12 DIAGNOSIS — R42 DIZZINESS: ICD-10-CM

## 2022-08-12 LAB
LEFT BULB EDV: 10.6 CM/S
LEFT BULB PSV: 49 CM/S
LEFT CCA DIST DIAS: 12.8 CM/S
LEFT CCA DIST SYS: 54.5 CM/S
LEFT CCA MID DIAS: 13.85 CM/S
LEFT CCA MID SYS: 77.6 CM/S
LEFT CCA PROX DIAS: 12.8 CM/S
LEFT CCA PROX SYS: 80.9 CM/S
LEFT ECA DIAS: 0 CM/S
LEFT ECA SYS: 87.5 CM/S
LEFT ICA DIST DIAS: 18.3 CM/S
LEFT ICA DIST SYS: 63.7 CM/S
LEFT ICA MID DIAS: 19.4 CM/S
LEFT ICA MID SYS: 67.7 CM/S
LEFT ICA PROX DIAS: 18.3 CM/S
LEFT ICA PROX SYS: 47.9 CM/S
LEFT ICA/CCA SYS: 1.24 NO UNITS
LEFT VERTEBRAL DIAS: 0 CM/S
LEFT VERTEBRAL SYS: 25.3 CM/S
RIGHT BULB EDV: 12.8 CM/S
RIGHT BULB PSV: 42.4 CM/S
RIGHT CCA DIST DIAS: 11.1 CM/S
RIGHT CCA DIST SYS: 56.4 CM/S
RIGHT CCA MID DIAS: 17.6 CM/S
RIGHT CCA MID SYS: 92.66 CM/S
RIGHT CCA PROX DIAS: 15 CM/S
RIGHT CCA PROX SYS: 96.5 CM/S
RIGHT ECA DIAS: 0 CM/S
RIGHT ECA SYS: 89.5 CM/S
RIGHT ICA DIST DIAS: 10.8 CM/S
RIGHT ICA DIST SYS: 42.4 CM/S
RIGHT ICA MID DIAS: 15 CM/S
RIGHT ICA MID SYS: 62.2 CM/S
RIGHT ICA PROX DIAS: 15 CM/S
RIGHT ICA PROX SYS: 65.5 CM/S
RIGHT ICA/CCA SYS: 1.2 NO UNITS
RIGHT VERTEBRAL DIAS: 9.68 CM/S
RIGHT VERTEBRAL SYS: 33.2 CM/S
VAS LEFT SUBCLAVIAN PROX EDV: 0 CM/S
VAS LEFT SUBCLAVIAN PROX PSV: 77.5 CM/S
VAS RIGHT SUBCLAVIAN PROX EDV: 0 CM/S
VAS RIGHT SUBCLAVIAN PROX PSV: 61.4 CM/S

## 2022-08-12 PROCEDURE — 93880 EXTRACRANIAL BILAT STUDY: CPT

## 2022-08-16 ENCOUNTER — TRANSCRIBE ORDER (OUTPATIENT)
Dept: SCHEDULING | Age: 77
End: 2022-08-16

## 2022-08-16 DIAGNOSIS — R42 DIZZINESS: Primary | ICD-10-CM

## 2022-08-16 DIAGNOSIS — R42 POSTURAL DIZZINESS WITH PRESYNCOPE: Primary | ICD-10-CM

## 2022-08-16 DIAGNOSIS — R55 POSTURAL DIZZINESS WITH PRESYNCOPE: Primary | ICD-10-CM

## 2022-08-19 ENCOUNTER — TRANSCRIBE ORDER (OUTPATIENT)
Dept: SCHEDULING | Age: 77
End: 2022-08-19

## 2022-08-19 DIAGNOSIS — R42 DIZZINESS: Primary | ICD-10-CM

## 2022-08-22 ENCOUNTER — HOSPITAL ENCOUNTER (OUTPATIENT)
Dept: CT IMAGING | Age: 77
Discharge: HOME | End: 2022-08-22
Attending: STUDENT IN AN ORGANIZED HEALTH CARE EDUCATION/TRAINING PROGRAM

## 2022-08-22 DIAGNOSIS — R42 POSTURAL DIZZINESS WITH PRESYNCOPE: ICD-10-CM

## 2022-08-22 DIAGNOSIS — R55 POSTURAL DIZZINESS WITH PRESYNCOPE: ICD-10-CM

## 2022-08-26 ENCOUNTER — HOSPITAL ENCOUNTER (OUTPATIENT)
Dept: CT IMAGING | Age: 77
Discharge: HOME | End: 2022-08-26
Attending: STUDENT IN AN ORGANIZED HEALTH CARE EDUCATION/TRAINING PROGRAM
Payer: MEDICARE

## 2022-08-26 DIAGNOSIS — R55 POSTURAL DIZZINESS WITH PRESYNCOPE: ICD-10-CM

## 2022-08-26 DIAGNOSIS — R42 POSTURAL DIZZINESS WITH PRESYNCOPE: ICD-10-CM

## 2022-08-26 PROCEDURE — 70450 CT HEAD/BRAIN W/O DYE: CPT

## 2023-05-24 ENCOUNTER — HOSPITAL ENCOUNTER (OUTPATIENT)
Facility: HOSPITAL | Age: 78
Discharge: HOME | End: 2023-05-27
Payer: MEDICARE

## 2023-05-24 DIAGNOSIS — N63.24 BREAST LUMP ON LEFT SIDE AT 8 O'CLOCK POSITION: ICD-10-CM

## 2023-05-24 PROCEDURE — 76642 ULTRASOUND BREAST LIMITED: CPT

## 2023-05-24 PROCEDURE — 77066 DX MAMMO INCL CAD BI: CPT

## 2023-10-22 ENCOUNTER — HOSPITAL ENCOUNTER (EMERGENCY)
Facility: HOSPITAL | Age: 78
Discharge: HOME | End: 2023-10-23
Attending: EMERGENCY MEDICINE
Payer: MEDICARE

## 2023-10-22 ENCOUNTER — APPOINTMENT (OUTPATIENT)
Facility: HOSPITAL | Age: 78
End: 2023-10-22
Payer: MEDICARE

## 2023-10-22 VITALS
RESPIRATION RATE: 16 BRPM | WEIGHT: 200.4 LBS | BODY MASS INDEX: 26.56 KG/M2 | HEIGHT: 73 IN | TEMPERATURE: 97.4 F | HEART RATE: 62 BPM | SYSTOLIC BLOOD PRESSURE: 110 MMHG | OXYGEN SATURATION: 95 % | DIASTOLIC BLOOD PRESSURE: 74 MMHG

## 2023-10-22 DIAGNOSIS — F03.90 DEMENTIA, UNSPECIFIED DEMENTIA SEVERITY, UNSPECIFIED DEMENTIA TYPE, UNSPECIFIED WHETHER BEHAVIORAL, PSYCHOTIC, OR MOOD DISTURBANCE OR ANXIETY (HCC): Primary | ICD-10-CM

## 2023-10-22 LAB
ANION GAP SERPL CALC-SCNC: 3 MMOL/L (ref 3–18)
APPEARANCE UR: CLEAR
BACTERIA URNS QL MICRO: NEGATIVE /HPF
BASOPHILS # BLD: 0 K/UL (ref 0–0.1)
BASOPHILS NFR BLD: 0 % (ref 0–2)
BILIRUB UR QL: NEGATIVE
BUN SERPL-MCNC: 21 MG/DL (ref 7–18)
BUN/CREAT SERPL: 14 (ref 12–20)
CALCIUM SERPL-MCNC: 8.4 MG/DL (ref 8.5–10.1)
CHLORIDE SERPL-SCNC: 107 MMOL/L (ref 100–111)
CO2 SERPL-SCNC: 28 MMOL/L (ref 21–32)
COLOR UR: ABNORMAL
CREAT SERPL-MCNC: 1.47 MG/DL (ref 0.6–1.3)
DIFFERENTIAL METHOD BLD: ABNORMAL
EOSINOPHIL # BLD: 0.1 K/UL (ref 0–0.4)
EOSINOPHIL NFR BLD: 1 % (ref 0–5)
EPITH CASTS URNS QL MICRO: NEGATIVE /LPF (ref 0–5)
ERYTHROCYTE [DISTWIDTH] IN BLOOD BY AUTOMATED COUNT: 13.7 % (ref 11.6–14.5)
GLUCOSE BLD STRIP.AUTO-MCNC: 161 MG/DL (ref 70–110)
GLUCOSE SERPL-MCNC: 198 MG/DL (ref 74–99)
GLUCOSE UR STRIP.AUTO-MCNC: NEGATIVE MG/DL
HCT VFR BLD AUTO: 47.4 % (ref 36–48)
HGB BLD-MCNC: 15.9 G/DL (ref 13–16)
HGB UR QL STRIP: NEGATIVE
IMM GRANULOCYTES # BLD AUTO: 0 K/UL (ref 0–0.04)
IMM GRANULOCYTES NFR BLD AUTO: 0 % (ref 0–0.5)
KETONES UR QL STRIP.AUTO: NEGATIVE MG/DL
LEUKOCYTE ESTERASE UR QL STRIP.AUTO: NEGATIVE
LYMPHOCYTES # BLD: 1.5 K/UL (ref 0.9–3.6)
LYMPHOCYTES NFR BLD: 16 % (ref 21–52)
MAGNESIUM SERPL-MCNC: 1.8 MG/DL (ref 1.6–2.6)
MCH RBC QN AUTO: 29.9 PG (ref 24–34)
MCHC RBC AUTO-ENTMCNC: 33.5 G/DL (ref 31–37)
MCV RBC AUTO: 89.3 FL (ref 78–100)
MONOCYTES # BLD: 0.5 K/UL (ref 0.05–1.2)
MONOCYTES NFR BLD: 5 % (ref 3–10)
MUCOUS THREADS URNS QL MICRO: ABNORMAL /LPF
NEUTS SEG # BLD: 7.3 K/UL (ref 1.8–8)
NEUTS SEG NFR BLD: 78 % (ref 40–73)
NITRITE UR QL STRIP.AUTO: NEGATIVE
NRBC # BLD: 0 K/UL (ref 0–0.01)
NRBC BLD-RTO: 0 PER 100 WBC
PH UR STRIP: 5.5 (ref 5–8)
PLATELET # BLD AUTO: 211 K/UL (ref 135–420)
PMV BLD AUTO: 11.2 FL (ref 9.2–11.8)
POTASSIUM SERPL-SCNC: 3.4 MMOL/L (ref 3.5–5.5)
PROT UR STRIP-MCNC: ABNORMAL MG/DL
RBC # BLD AUTO: 5.31 M/UL (ref 4.35–5.65)
RBC #/AREA URNS HPF: NEGATIVE /HPF (ref 0–5)
SODIUM SERPL-SCNC: 138 MMOL/L (ref 136–145)
SP GR UR REFRACTOMETRY: 1.03 (ref 1–1.03)
TROPONIN I SERPL HS-MCNC: 6 NG/L (ref 0–78)
UROBILINOGEN UR QL STRIP.AUTO: 1 EU/DL (ref 0.2–1)
WBC # BLD AUTO: 9.3 K/UL (ref 4.6–13.2)
WBC URNS QL MICRO: NEGATIVE /HPF (ref 0–4)

## 2023-10-22 PROCEDURE — 81001 URINALYSIS AUTO W/SCOPE: CPT

## 2023-10-22 PROCEDURE — 99284 EMERGENCY DEPT VISIT MOD MDM: CPT

## 2023-10-22 PROCEDURE — 85025 COMPLETE CBC W/AUTO DIFF WBC: CPT

## 2023-10-22 PROCEDURE — 93005 ELECTROCARDIOGRAM TRACING: CPT | Performed by: EMERGENCY MEDICINE

## 2023-10-22 PROCEDURE — 83735 ASSAY OF MAGNESIUM: CPT

## 2023-10-22 PROCEDURE — 70450 CT HEAD/BRAIN W/O DYE: CPT

## 2023-10-22 PROCEDURE — 80048 BASIC METABOLIC PNL TOTAL CA: CPT

## 2023-10-22 PROCEDURE — 84484 ASSAY OF TROPONIN QUANT: CPT

## 2023-10-22 PROCEDURE — 82962 GLUCOSE BLOOD TEST: CPT

## 2023-10-22 NOTE — ED PROVIDER NOTES
[facility] EMERGENCY DEPT  EMERGENCY DEPARTMENT ENCOUNTER      Pt Name: Yao Thompson  MRN: [id number]  [address] [facility] 1945  Date of evaluation: 10/22/2023  Provider: Keri Pandey MD    [address]       Chief Complaint   Patient presents with    Altered Mental Status         HISTORY OF PRESENT ILLNESS   (Location/Symptom, Timing/Onset, Context/Setting, Quality, Duration, Modifying Factors, Severity)  Note limiting factors. Yao Thompson is a 69 y.o. male who presents to the emergency department     69-year-old male wife states he has no medical problems except dementia chart shows he has hyperlipidemia GERD hypothyroidism and nephrolithiasis presents the emergency department because he was weak at discharge. It was reported he went down to the ground but did not hit his head. Patient was confused and sweating with a fingerstick of 66 no history of diabetes. Medics gave him D10 in route his blood pressure was in the 90s. He was also given IV fluids. Patient is still confused however he is much improved. Wife is not at the bedside. Patient cannot provide a history. The history is provided by the patient, the spouse, the EMS personnel and medical records. No  was used. Nursing Notes were reviewed. REVIEW OF SYSTEMS    (2-9 systems for level 4, 10 or more for level 5)     Review of Systems   Unable to perform ROS: Dementia       Except as noted above the remainder of the review of systems was reviewed and negative.        PAST MEDICAL HISTORY     Past Medical History:   Diagnosis Date    Asbestos exposure     FHx: hypothyroidism     GERD (gastroesophageal reflux disease)     Hyperlipidemia     Hypertension     Idiopathic hypersomnia     Nephrolithiasis     Unspecified sleep apnea     no C-Pap         SURGICAL HISTORY       Past Surgical History:   Procedure Laterality Date    COLONOSCOPY  4/8/14    10 yrs    ORTHOPEDIC SURGERY Right 10/27/2016    rotator cuff repair

## 2023-10-22 NOTE — ED TRIAGE NOTES
Pt arrived in ER via EMS from [facility] after a syncopal episode. Wife denies injury to head. Pt has hx of dementia, hypertension, and hypothyroidism. Pt and wife are poor historians.

## 2023-10-23 LAB
EKG ATRIAL RATE: 53 BPM
EKG DIAGNOSIS: NORMAL
EKG P AXIS: 38 DEGREES
EKG P-R INTERVAL: 198 MS
EKG Q-T INTERVAL: 502 MS
EKG QRS DURATION: 90 MS
EKG QTC CALCULATION (BAZETT): 471 MS
EKG R AXIS: 57 DEGREES
EKG T AXIS: 52 DEGREES
EKG VENTRICULAR RATE: 53 BPM

## 2023-10-23 PROCEDURE — 93010 ELECTROCARDIOGRAM REPORT: CPT | Performed by: INTERNAL MEDICINE

## 2023-10-23 NOTE — ED NOTES
Patient discharged at this time. Discharge instructions explained to patient and patient verbalized understanding. Patient is ambulatory, alert, and oriented at discharge.       Yadira Oviedo RN  10/23/23 1143

## 2023-12-06 ENCOUNTER — HOSPITAL ENCOUNTER (EMERGENCY)
Facility: HOSPITAL | Age: 78
Discharge: HOME | End: 2023-12-07
Attending: EMERGENCY MEDICINE
Payer: MEDICARE

## 2023-12-06 DIAGNOSIS — R53.1 GENERAL WEAKNESS: ICD-10-CM

## 2023-12-06 DIAGNOSIS — F03.C0 SEVERE DEMENTIA, UNSPECIFIED DEMENTIA TYPE, UNSPECIFIED WHETHER BEHAVIORAL, PSYCHOTIC, OR MOOD DISTURBANCE OR ANXIETY (HCC): ICD-10-CM
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
## 2023-12-06 PROCEDURE — 85025 COMPLETE CBC W/AUTO DIFF WBC: CPT

## 2023-12-06 PROCEDURE — 84484 ASSAY OF TROPONIN QUANT: CPT

## 2023-12-06 PROCEDURE — 93005 ELECTROCARDIOGRAM TRACING: CPT | Performed by: EMERGENCY MEDICINE

## 2023-12-06 PROCEDURE — 80053 COMPREHEN METABOLIC PANEL: CPT

## 2023-12-06 PROCEDURE — 99284 EMERGENCY DEPT VISIT MOD MDM: CPT

## 2023-12-06 ASSESSMENT — PAIN - FUNCTIONAL ASSESSMENT: PAIN_FUNCTIONAL_ASSESSMENT: NONE - DENIES PAIN

## 2023-12-07 VITALS
OXYGEN SATURATION: 98 % | TEMPERATURE: 97.7 F | HEART RATE: 53 BPM | BODY MASS INDEX: 26.51 KG/M2 | DIASTOLIC BLOOD PRESSURE: 71 MMHG | HEIGHT: 73 IN | RESPIRATION RATE: 16 BRPM | WEIGHT: 200 LBS | SYSTOLIC BLOOD PRESSURE: 133 MMHG

## 2023-12-07 LAB
ALBUMIN SERPL-MCNC: 3.6 G/DL (ref 3.4–5)
ALBUMIN/GLOB SERPL: 1.2 (ref 0.8–1.7)
ALP SERPL-CCNC: 70 U/L (ref 45–117)
ALT SERPL-CCNC: 14 U/L (ref 16–61)
ANION GAP SERPL CALC-SCNC: 4 MMOL/L (ref 3–18)
AST SERPL-CCNC: 16 U/L (ref 10–38)
BASOPHILS # BLD: 0 K/UL (ref 0–0.1)
BASOPHILS NFR BLD: 0 % (ref 0–2)
BILIRUB SERPL-MCNC: 0.6 MG/DL (ref 0.2–1)
BUN SERPL-MCNC: 25 MG/DL (ref 7–18)
BUN/CREAT SERPL: 19 (ref 12–20)
CALCIUM SERPL-MCNC: 9.1 MG/DL (ref 8.5–10.1)
CHLORIDE SERPL-SCNC: 109 MMOL/L (ref 100–111)
CO2 SERPL-SCNC: 29 MMOL/L (ref 21–32)
CREAT SERPL-MCNC: 1.35 MG/DL (ref 0.6–1.3)
DIFFERENTIAL METHOD BLD: ABNORMAL
EKG ATRIAL RATE: 57 BPM
EKG DIAGNOSIS: NORMAL
EKG P AXIS: 47 DEGREES
EKG P-R INTERVAL: 196 MS
EKG Q-T INTERVAL: 474 MS
EKG QRS DURATION: 90 MS
EKG QTC CALCULATION (BAZETT): 461 MS
EKG R AXIS: 59 DEGREES
EKG T AXIS: 60 DEGREES
EKG VENTRICULAR RATE: 57 BPM
EOSINOPHIL # BLD: 0.2 K/UL (ref 0–0.4)
EOSINOPHIL NFR BLD: 2 % (ref 0–5)
ERYTHROCYTE [DISTWIDTH] IN BLOOD BY AUTOMATED COUNT: 14.1 % (ref 11.6–14.5)
GLOBULIN SER CALC-MCNC: 2.9 G/DL (ref 2–4)
GLUCOSE SERPL-MCNC: 114 MG/DL (ref 74–99)
HCT VFR BLD AUTO: 51.4 % (ref 36–48)
HGB BLD-MCNC: 17 G/DL (ref 13–16)
IMM GRANULOCYTES # BLD AUTO: 0 K/UL (ref 0–0.04)
IMM GRANULOCYTES NFR BLD AUTO: 0 % (ref 0–0.5)
LYMPHOCYTES # BLD: 1.5 K/UL (ref 0.9–3.6)
LYMPHOCYTES NFR BLD: 13 % (ref 21–52)
MCH RBC QN AUTO: 29.5 PG (ref 24–34)
MCHC RBC AUTO-ENTMCNC: 33.1 G/DL (ref 31–37)
MCV RBC AUTO: 89.1 FL (ref 78–100)
MONOCYTES # BLD: 0.6 K/UL (ref 0.05–1.2)
MONOCYTES NFR BLD: 5 % (ref 3–10)
NEUTS SEG # BLD: 9.5 K/UL (ref 1.8–8)
NEUTS SEG NFR BLD: 81 % (ref 40–73)
NRBC # BLD: 0 K/UL (ref 0–0.01)
NRBC BLD-RTO: 0 PER 100 WBC
PLATELET # BLD AUTO: 228 K/UL (ref 135–420)
PMV BLD AUTO: 10.6 FL (ref 9.2–11.8)
POTASSIUM SERPL-SCNC: 4 MMOL/L (ref 3.5–5.5)
PROT SERPL-MCNC: 6.5 G/DL (ref 6.4–8.2)
RBC # BLD AUTO: 5.77 M/UL (ref 4.35–5.65)
SODIUM SERPL-SCNC: 142 MMOL/L (ref 136–145)
TROPONIN I SERPL HS-MCNC: 7 NG/L (ref 0–78)
WBC # BLD AUTO: 11.8 K/UL (ref 4.6–13.2)

## 2023-12-07 PROCEDURE — 93010 ELECTROCARDIOGRAM REPORT: CPT | Performed by: INTERNAL MEDICINE

## 2023-12-07 NOTE — ED TRIAGE NOTES
Ems brought pt in for syncope episode states that he was in the bathroom and went to vomit and slid down into the floor. Pt has dementia. Ems states that no injuries observed.  Family states that pt is less interactive

## (undated) DEVICE — TRAY PREP DRY W/ PREM GLV 2 APPL 6 SPNG 2 UNDPD 1 OVERWRAP

## (undated) DEVICE — (D)GLOVE SURG TRIFLX 8 PWD LTX -- DISC BY MFR USE ITEM 302994

## (undated) DEVICE — SOLUTION IRRIG 3000ML 0.9% SOD CHL FLX CONT 0797208] ICU MEDICAL INC]

## (undated) DEVICE — KENDALL SCD EXPRESS SLEEVES, KNEE LENGTH, MEDIUM: Brand: KENDALL SCD

## (undated) DEVICE — 3M™ BAIR PAWS FLEX™ WARMING GOWN, STANDARD, 20 PER CASE 81003: Brand: BAIR PAWS™

## (undated) DEVICE — GAUZE,SPONGE,4"X4",16PLY,STRL,LF,10/TRAY: Brand: MEDLINE

## (undated) DEVICE — Z DUP USE 2565107 PACK SURG PROC LEG CYSTO T-DRAPE REINF TBL CVR HND TWL

## (undated) DEVICE — Y-TYPE TUR/BLADDER IRRIGATION SET, REGULATING CLAMP